# Patient Record
Sex: FEMALE | Race: BLACK OR AFRICAN AMERICAN | NOT HISPANIC OR LATINO | URBAN - METROPOLITAN AREA
[De-identification: names, ages, dates, MRNs, and addresses within clinical notes are randomized per-mention and may not be internally consistent; named-entity substitution may affect disease eponyms.]

---

## 2020-11-19 ENCOUNTER — INPATIENT (INPATIENT)
Facility: HOSPITAL | Age: 70
LOS: 4 days | Discharge: HOME HEALTH | End: 2020-11-24
Attending: STUDENT IN AN ORGANIZED HEALTH CARE EDUCATION/TRAINING PROGRAM | Admitting: STUDENT IN AN ORGANIZED HEALTH CARE EDUCATION/TRAINING PROGRAM
Payer: SELF-PAY

## 2020-11-19 VITALS
RESPIRATION RATE: 16 BRPM | OXYGEN SATURATION: 100 % | SYSTOLIC BLOOD PRESSURE: 113 MMHG | HEART RATE: 103 BPM | DIASTOLIC BLOOD PRESSURE: 72 MMHG | TEMPERATURE: 98 F

## 2020-11-19 LAB
ALBUMIN SERPL ELPH-MCNC: 4.5 G/DL — SIGNIFICANT CHANGE UP (ref 3.3–5)
ALP SERPL-CCNC: 107 U/L — SIGNIFICANT CHANGE UP (ref 40–120)
ALT FLD-CCNC: 7 U/L — SIGNIFICANT CHANGE UP (ref 4–33)
ANION GAP SERPL CALC-SCNC: 15 MMO/L — HIGH (ref 7–14)
APPEARANCE UR: CLEAR — SIGNIFICANT CHANGE UP
APTT BLD: 25.4 SEC — LOW (ref 27–36.3)
AST SERPL-CCNC: 13 U/L — SIGNIFICANT CHANGE UP (ref 4–32)
BASOPHILS # BLD AUTO: 0.05 K/UL — SIGNIFICANT CHANGE UP (ref 0–0.2)
BASOPHILS NFR BLD AUTO: 0.5 % — SIGNIFICANT CHANGE UP (ref 0–2)
BILIRUB SERPL-MCNC: 0.7 MG/DL — SIGNIFICANT CHANGE UP (ref 0.2–1.2)
BILIRUB UR-MCNC: NEGATIVE — SIGNIFICANT CHANGE UP
BLOOD UR QL VISUAL: NEGATIVE — SIGNIFICANT CHANGE UP
BUN SERPL-MCNC: 24 MG/DL — HIGH (ref 7–23)
CALCIUM SERPL-MCNC: 9.9 MG/DL — SIGNIFICANT CHANGE UP (ref 8.4–10.5)
CHLORIDE SERPL-SCNC: 110 MMOL/L — HIGH (ref 98–107)
CO2 SERPL-SCNC: 20 MMOL/L — LOW (ref 22–31)
COLOR SPEC: SIGNIFICANT CHANGE UP
CREAT SERPL-MCNC: 0.89 MG/DL — SIGNIFICANT CHANGE UP (ref 0.5–1.3)
EOSINOPHIL # BLD AUTO: 0.06 K/UL — SIGNIFICANT CHANGE UP (ref 0–0.5)
EOSINOPHIL NFR BLD AUTO: 0.5 % — SIGNIFICANT CHANGE UP (ref 0–6)
GLUCOSE SERPL-MCNC: 89 MG/DL — SIGNIFICANT CHANGE UP (ref 70–99)
GLUCOSE UR-MCNC: NEGATIVE — SIGNIFICANT CHANGE UP
HCT VFR BLD CALC: 46.7 % — HIGH (ref 34.5–45)
HGB BLD-MCNC: 15.5 G/DL — SIGNIFICANT CHANGE UP (ref 11.5–15.5)
IMM GRANULOCYTES NFR BLD AUTO: 0.4 % — SIGNIFICANT CHANGE UP (ref 0–1.5)
INR BLD: 1.06 — SIGNIFICANT CHANGE UP (ref 0.88–1.16)
KETONES UR-MCNC: SIGNIFICANT CHANGE UP
LEUKOCYTE ESTERASE UR-ACNC: NEGATIVE — SIGNIFICANT CHANGE UP
LYMPHOCYTES # BLD AUTO: 3.58 K/UL — HIGH (ref 1–3.3)
LYMPHOCYTES # BLD AUTO: 32.4 % — SIGNIFICANT CHANGE UP (ref 13–44)
MAGNESIUM SERPL-MCNC: 2.1 MG/DL — SIGNIFICANT CHANGE UP (ref 1.6–2.6)
MCHC RBC-ENTMCNC: 30.6 PG — SIGNIFICANT CHANGE UP (ref 27–34)
MCHC RBC-ENTMCNC: 33.2 % — SIGNIFICANT CHANGE UP (ref 32–36)
MCV RBC AUTO: 92.1 FL — SIGNIFICANT CHANGE UP (ref 80–100)
MONOCYTES # BLD AUTO: 0.84 K/UL — SIGNIFICANT CHANGE UP (ref 0–0.9)
MONOCYTES NFR BLD AUTO: 7.6 % — SIGNIFICANT CHANGE UP (ref 2–14)
NEUTROPHILS # BLD AUTO: 6.49 K/UL — SIGNIFICANT CHANGE UP (ref 1.8–7.4)
NEUTROPHILS NFR BLD AUTO: 58.6 % — SIGNIFICANT CHANGE UP (ref 43–77)
NITRITE UR-MCNC: NEGATIVE — SIGNIFICANT CHANGE UP
NRBC # FLD: 0 K/UL — SIGNIFICANT CHANGE UP (ref 0–0)
PH UR: 5.5 — SIGNIFICANT CHANGE UP (ref 5–8)
PHOSPHATE SERPL-MCNC: 2.8 MG/DL — SIGNIFICANT CHANGE UP (ref 2.5–4.5)
PLATELET # BLD AUTO: 310 K/UL — SIGNIFICANT CHANGE UP (ref 150–400)
PMV BLD: 10.4 FL — SIGNIFICANT CHANGE UP (ref 7–13)
POTASSIUM SERPL-MCNC: 3.8 MMOL/L — SIGNIFICANT CHANGE UP (ref 3.5–5.3)
POTASSIUM SERPL-SCNC: 3.8 MMOL/L — SIGNIFICANT CHANGE UP (ref 3.5–5.3)
PROT SERPL-MCNC: 7.5 G/DL — SIGNIFICANT CHANGE UP (ref 6–8.3)
PROT UR-MCNC: 10 — SIGNIFICANT CHANGE UP
PROTHROM AB SERPL-ACNC: 12.1 SEC — SIGNIFICANT CHANGE UP (ref 10.6–13.6)
RBC # BLD: 5.07 M/UL — SIGNIFICANT CHANGE UP (ref 3.8–5.2)
RBC # FLD: 13.7 % — SIGNIFICANT CHANGE UP (ref 10.3–14.5)
SODIUM SERPL-SCNC: 145 MMOL/L — SIGNIFICANT CHANGE UP (ref 135–145)
SP GR SPEC: 1.02 — SIGNIFICANT CHANGE UP (ref 1–1.04)
TSH SERPL-MCNC: 1.85 UIU/ML — SIGNIFICANT CHANGE UP (ref 0.27–4.2)
UROBILINOGEN FLD QL: NORMAL — SIGNIFICANT CHANGE UP
WBC # BLD: 11.06 K/UL — HIGH (ref 3.8–10.5)
WBC # FLD AUTO: 11.06 K/UL — HIGH (ref 3.8–10.5)

## 2020-11-19 PROCEDURE — 71045 X-RAY EXAM CHEST 1 VIEW: CPT | Mod: 26

## 2020-11-19 PROCEDURE — 70450 CT HEAD/BRAIN W/O DYE: CPT | Mod: 26

## 2020-11-19 NOTE — ED ADULT TRIAGE NOTE - CHIEF COMPLAINT QUOTE
Pt brought in from urgent care for elevated blood pressure and dizziness. Pt endorses dizziness at this present moment, + headache, denies chest pain, no shortness of breath. Pt has h/o cva in the past, unable to tell me if she had any residual weakness related to cva in the past. Pt endorses increasing weakness to Rt lower extremity and rt upper extremity, states this weakness has progressed for months. Pt not on anti-coagulation. Pt ambulatory with cane.

## 2020-11-19 NOTE — ED ADULT NURSE NOTE - OBJECTIVE STATEMENT
Received PT to room 1, A&Ox3, with moments of confusion, c/o right sided pain and numbness (arm, hip, and lower leg). PT states was sent by urgent care due to HTN and dizziness. PT states symptoms have been for about a month. Ambulates with cane. Denies chest pain, SOB, abdominal pain, N/V/D, dysuria. No swelling noted. Left hand 22g placed, labs drawn, bed in lowest, rails up, will continue to monitor. Left

## 2020-11-19 NOTE — ED PROVIDER NOTE - PROGRESS NOTE DETAILS
Mimi Rivero M.D. Resident  Neuro will see patient after higher priority patients seen. Jimmie BARGER: Patient received at signout pending neuro eval and recs. Reassessed and endorse R sided weakness and difficulty ambulating

## 2020-11-19 NOTE — ED PROVIDER NOTE - PHYSICAL EXAMINATION
Mimi Rivero M.D. Resident  GENl: Patient awake alert NAD.   HEENT: normocephalic, atraumatic, EOMI, PERRL, no scleral icterus, moist MM  CARDIAC: RRR, S1, S2, no murmur.   PULM: CTA B/L no wheeze, rhonchi, rales.   ABD: soft NT, ND, no rebound no guarding, no CVA tenderness.   MSK: no edema. +intermittently moves all extremities, 4/5 strength in right upper and lower extremity, ROM of upper and lower extremity limited by pain.      NEURO: A&Ox3, + paresthesia and weakness in upper and lower extremity + bicep and patellar reflex on right side.   SKIN: warm, dry, no rash.

## 2020-11-19 NOTE — ED PROVIDER NOTE - CLINICAL SUMMARY MEDICAL DECISION MAKING FREE TEXT BOX
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 Inconsistent neurological exam, strength waxes and wanes, ROM limited by pain. Possible CNS or PNS, endocrine, electrolyte etiology, progression of neurosarcoidoses. Less likely infection. CT head, labs, electrolyte, neuro consult, admission.

## 2020-11-19 NOTE — ED PROVIDER NOTE - ATTENDING CONTRIBUTION TO CARE
Attending Statement: I have personally seen and examined this patient. I have fully participated in the care of this patient. I have reviewed all pertinent clinical information, including history physical exam, plan and the Resident's note and agree except as noted  71 yo F pmhx of CVA no residual deficits, HTN, seizures, neurosarcoidosis not on medications, from home   weakness of the right side of body x a month, intermittent pain, forgetfulness and dec function over a month. no new sx today, endorsing pain of the right arm/leg. paresthesia/shooting pain of the right arm/leg. no headache no change vision/speech no cp or sob no abdominal pain no n/v no urinary complaints  Vital signs noted. AO3 nontoxic female. no resp distress soft nontender abdomen. no  rebound. no guarding. no sign of trauma. no CVAT moving all ext, nl  bl hands. dec sensation of right leg. able to walk w cane. no pedal edema. no calf tenderness. normal pulses bilateral feet.  plan labs, ct head, urine, neurology cs, tba lives alone.

## 2020-11-19 NOTE — ED PROVIDER NOTE - NS ED ROS FT
GENERAL: No fever, chills + generalized weakness.   EYES: no vision changes, no discharge.   ENT: no difficulty swallowing or speaking   CARDIAC: no chest pain/pressure, SOB, lower extremity swelling  PULMONARY: no cough, SOB  GI: no abdominal pain, n/v/d  : no dysuria, no hematuria  SKIN: no rashes, no ecchymosis  NEURO: no headache, lightheadedness  MSK: + right arm and leg weakness and pain.

## 2020-11-19 NOTE — CONSULT NOTE ADULT - ATTENDING COMMENTS
Patient seen and examined with neurology team and above note reviewed and I agree with assessment and plan as outlined. Patient hx as noted and presents with bilateral leg weakness and heaviness , poor gait and increased pain in her legs. She believes she may have fallen the day prior to admission but cannot recall the details. In addition, she feels her memory and recall has been poor and her ability to retain information as well.   On exam she has normal cranial nerves, motor exam shows limited mobility and range of motion of bilateral legs and increased sensitivity to touch and vibration.  No weakness in arms.  Reflexes are diminished in legs and plantars are downgoing.  Gait cannot assess    MRI brain reviewed and no acute ischemia, old left frontal contusion and microvascular ischemic changes.    Assessment and plan: Patient is 70 years old with leg weakness, and heaviness and allodynia in bilateral legs as well with decreased reflexes.    1. Obtain MRI lumbar spine without contrast   2. CPK levels and ACE levels   3. MRI brain with contrast for more detailed assessment given her hx of sarcoidosis   4. PT and OT   5. Conservative pain control    All questions answered and patient understood plan and is willing to comply.

## 2020-11-19 NOTE — ED PROVIDER NOTE - OBJECTIVE STATEMENT
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 Pain is worse with movement, waxes and wanes, shoots from proximal to distal extremities. Per daughter at bedside, pt lives alone, has been more forgetful, and having difficulty managing living by herself. Pt endorse SOB with exertion and fatigue, but no CP, no nausea, vomiting, dizziness, GI or  symptoms, fever or chills.

## 2020-11-19 NOTE — CONSULT NOTE ADULT - SUBJECTIVE AND OBJECTIVE BOX
NAV ANNA  Female  MRN-[id number]    HPI:    70F w/ PMH of ischemic stroke (w/ no residual, unclear territory, on ASA?), Seizure disorder (on ?), neurosarcoidosis (not on meds) presents w/ one month of RUE and RLE pain and weakness for the past month.       CTH: No acute pathology. Encephelomalacia of the L. anterior inferior frontal cortex consistent w/ trauama vs. chronic infarct.     NIHSS:  MRS:     ROS: All negative except as mentioned in HPI    PAST MEDICAL & SURGICAL HISTORY:  CVA (cerebral vascular accident)    HTN (hypertension)    Seizure    No significant past surgical history      MEDICATIONS  (STANDING):    MEDICATIONS  (PRN):    Allergies    No Known Allergies    Intolerances        VITAL SIGNS:  T(F): 98.4  HR: 93  BP: 139/73  RR: 16  SpO2: 98%    Exam:   MS: Oriented x3. Fluent. Follows crossed commands.   CN: VFF. EOMI. V1-V3 intact. Face symmetric. T/u midline.   Motor: Full strength throughout.   Sensory: Intact to LT throughout   Reflexes: 2+ throughout. Babinski absent bilaterally.   Coordination: No dysmetria on FNF or ataxia on HTS bilaterally   Gait: Deferred    LABS:                          15.5   11.06 )-----------( 310      ( 19 Nov 2020 20:27 )             46.7     11-19    145  |  110<H>  |  24<H>  ----------------------------<  89  3.8   |  20<L>  |  0.89    Ca    9.9      19 Nov 2020 20:27  Phos  2.8     11-19  Mg     2.1     11-19    TPro  7.5  /  Alb  4.5  /  TBili  0.7  /  DBili  x   /  AST  13  /  ALT  7   /  AlkPhos  107  11-19    PT/INR - ( 19 Nov 2020 20:27 )   PT: 12.1 SEC;   INR: 1.06          PTT - ( 19 Nov 2020 20:27 )  PTT:25.4 SEC    RADIOLOGY & ADDITIONAL STUDIES:             NAV ANNA  Female  MRN-[id number]    HPI:    70F w/ PMH of ischemic stroke (w/ no residual, unclear territory), Seizure disorder (not on meds, last seizure decade ago), neurosarcoidosis (not on meds) presents w/ one month of RUE and RLE pain and weakness for the past few months.     Patient is a poor historian. States she has memory difficulties. I was unable to get a hold of her daugther for collateral. Patient states for the past couple months shes noticed worsening pain and weaknes on her right side. Burning type pain. Tender to the touch. Denies falling. No previous history of such complaints. No urinary issues.     Has history of neurosarcoid dx several years ago. Does not follow with Rheumatology. No meds. Last seizure over a decade ago. Not on any meds.     CTH: No acute pathology. Encephelomalacia of the L. anterior inferior frontal cortex consistent w/ trauama vs. chronic infarct.     ROS: All negative except as mentioned in HPI    PAST MEDICAL & SURGICAL HISTORY:  CVA (cerebral vascular accident)    HTN (hypertension)    Seizure    No significant past surgical history      MEDICATIONS  (STANDING):    MEDICATIONS  (PRN):    Allergies    No Known Allergies    Intolerances        VITAL SIGNS:  T(F): 98.4  HR: 93  BP: 139/73  RR: 16  SpO2: 98%    Exam:   MS: Oriented x3. Fluent. Follows crossed commands.   CN: VFF. EOMI. V1-V3 intact. Face symmetric. T/u midline.   Motor: RUE weakness involving the bicep and tricep though pain limited. RLE some movement in plane of bed but no effort against gravity. Left side full.    Sensory: Intact to LT throughout   Reflexes: 1+ throughout. Babinski absent bilaterally.   Coordination: No dysmetria on FNF or ataxia on HTS bilaterally   Gait: Deferred    LABS:                          15.5   11.06 )-----------( 310      ( 19 Nov 2020 20:27 )             46.7     11-19    145  |  110<H>  |  24<H>  ----------------------------<  89  3.8   |  20<L>  |  0.89    Ca    9.9      19 Nov 2020 20:27  Phos  2.8     11-19  Mg     2.1     11-19    TPro  7.5  /  Alb  4.5  /  TBili  0.7  /  DBili  x   /  AST  13  /  ALT  7   /  AlkPhos  107  11-19    PT/INR - ( 19 Nov 2020 20:27 )   PT: 12.1 SEC;   INR: 1.06          PTT - ( 19 Nov 2020 20:27 )  PTT:25.4 SEC    RADIOLOGY & ADDITIONAL STUDIES:             NAV ANNA  Female  MRN-[id number]    HPI:    70F w/ PMH of ischemic stroke (w/ no residual, unclear territory), Seizure disorder (not on meds, last seizure decade ago), neurosarcoidosis (not on meds) presents w/ one month of RUE and RLE pain and weakness for the past few months.     Patient is a poor historian. States she has memory difficulties. I was unable to get a hold of her daugther for collateral. Patient states for the past couple months shes noticed worsening pain and weaknes on her right side. Burning type pain. Tender to the touch. Denies falling. No previous history of such complaints. No urinary issues.     Has history of neurosarcoid dx several years ago. Does not follow with Rheumatology. No meds. Last seizure over a decade ago. Not on any meds.     CTH: No acute pathology. Encephelomalacia of the L. anterior inferior frontal cortex consistent w/ trauama vs. chronic infarct.     ROS: All negative except as mentioned in HPI    PAST MEDICAL & SURGICAL HISTORY:  CVA (cerebral vascular accident)    HTN (hypertension)    Seizure    No significant past surgical history      MEDICATIONS  (STANDING):    MEDICATIONS  (PRN):    Allergies    No Known Allergies    Intolerances        VITAL SIGNS:  T(F): 98.4  HR: 93  BP: 139/73  RR: 16  SpO2: 98%    Exam:   MS: Oriented x3. Fluent. Follows crossed commands.   CN: VFF. EOMI. V1-V3 intact. Face symmetric. T/u midline.   Motor: RUE weakness involving the bicep and tricep though pain limited. RLE some movement in plane of bed but no effort against gravity. Left side full.    Sensory: Allodynia to LT and PP involving the RUE and RLE   Reflexes: 1+ throughout. Babinski absent bilaterally.   Coordination: No dysmetria on FNF or ataxia on HTS bilaterally   Gait: Deferred    LABS:                          15.5   11.06 )-----------( 310      ( 19 Nov 2020 20:27 )             46.7     11-19    145  |  110<H>  |  24<H>  ----------------------------<  89  3.8   |  20<L>  |  0.89    Ca    9.9      19 Nov 2020 20:27  Phos  2.8     11-19  Mg     2.1     11-19    TPro  7.5  /  Alb  4.5  /  TBili  0.7  /  DBili  x   /  AST  13  /  ALT  7   /  AlkPhos  107  11-19    PT/INR - ( 19 Nov 2020 20:27 )   PT: 12.1 SEC;   INR: 1.06          PTT - ( 19 Nov 2020 20:27 )  PTT:25.4 SEC    RADIOLOGY & ADDITIONAL STUDIES:

## 2020-11-19 NOTE — CONSULT NOTE ADULT - ASSESSMENT
Plan:   [] MR brain w/w/o   [] ESR, CRP  [] Rheumatology consult    70F w/ PMH of ischemic stroke (w/ no residual, unclear territory), Seizure disorder (not on meds, last seizure decade ago), neurosarcoidosis (not on meds) presents w/ one month of RUE and RLE pain and weakness for the past few months. Exam w/ R. hemiparesis, pain limited.     Impression: Subacute, progressive, painful R. hemiaparesis consistent w/ L. thalamic vs. peripheral nerve dysfunction. Differential includes thalamic infarct, Focal seizures, CNS inflammation, PNS inflammation (i.e mononeuritis multiplex related to neurosarcoid). Doubt cord pathology given the painful component.     Plan:   [] MR brain w/w/o   [] VEEG for 24 hours, can stop if negative   [] ESR, CRP, DSDNA, CARLITOS, ANCA  [] Rheumatology consult   [] PT/OT    70F w/ PMH of ischemic stroke (w/ no residual, unclear territory), Seizure disorder (not on meds, last seizure decade ago), neurosarcoidosis (not on meds) presents w/ one month of RUE and RLE pain and weakness for the past few months. Exam w/ R. hemiparesis, pain limited.     Impression: Subacute, progressive, painful R. hemiaparesis consistent w/ L. thalamic vs. peripheral nerve dysfunction. Differential includes thalamic infarct, Focal seizures, CNS inflammation, PNS inflammation (i.e mononeuritis multiplex related to neurosarcoid). Doubt cord pathology given the painful component.     Plan:     [] MR brain w/w/o   [] VEEG for 24 hours, can stop if negative   [] ESR, CRP, DSDNA, CARLITOS, ANCA  [] Rheumatology consult   [] PT/OT

## 2020-11-19 NOTE — ED ADULT NURSE REASSESSMENT NOTE - NS ED NURSE REASSESS COMMENT FT1
PT in bed, VS stable. Not expressing pain at the moment, appears comfortable. Will continue to monitor. Awaiting neuro.

## 2020-11-20 DIAGNOSIS — Z29.9 ENCOUNTER FOR PROPHYLACTIC MEASURES, UNSPECIFIED: ICD-10-CM

## 2020-11-20 DIAGNOSIS — Z93.0 TRACHEOSTOMY STATUS: Chronic | ICD-10-CM

## 2020-11-20 DIAGNOSIS — D86.89 SARCOIDOSIS OF OTHER SITES: ICD-10-CM

## 2020-11-20 DIAGNOSIS — I10 ESSENTIAL (PRIMARY) HYPERTENSION: ICD-10-CM

## 2020-11-20 DIAGNOSIS — R53.1 WEAKNESS: ICD-10-CM

## 2020-11-20 DIAGNOSIS — Z90.89 ACQUIRED ABSENCE OF OTHER ORGANS: Chronic | ICD-10-CM

## 2020-11-20 DIAGNOSIS — R56.9 UNSPECIFIED CONVULSIONS: ICD-10-CM

## 2020-11-20 LAB
ALBUMIN SERPL ELPH-MCNC: 4.4 G/DL — SIGNIFICANT CHANGE UP (ref 3.3–5)
ALP SERPL-CCNC: 111 U/L — SIGNIFICANT CHANGE UP (ref 40–120)
ALT FLD-CCNC: 6 U/L — SIGNIFICANT CHANGE UP (ref 4–33)
ANION GAP SERPL CALC-SCNC: 15 MMO/L — HIGH (ref 7–14)
AST SERPL-CCNC: 15 U/L — SIGNIFICANT CHANGE UP (ref 4–32)
BASOPHILS # BLD AUTO: 0.04 K/UL — SIGNIFICANT CHANGE UP (ref 0–0.2)
BASOPHILS NFR BLD AUTO: 0.4 % — SIGNIFICANT CHANGE UP (ref 0–2)
BILIRUB SERPL-MCNC: 1.3 MG/DL — HIGH (ref 0.2–1.2)
BUN SERPL-MCNC: 20 MG/DL — SIGNIFICANT CHANGE UP (ref 7–23)
CALCIUM SERPL-MCNC: 10.2 MG/DL — SIGNIFICANT CHANGE UP (ref 8.4–10.5)
CHLORIDE SERPL-SCNC: 109 MMOL/L — HIGH (ref 98–107)
CHOLEST SERPL-MCNC: 218 MG/DL — HIGH (ref 120–199)
CO2 SERPL-SCNC: 21 MMOL/L — LOW (ref 22–31)
CREAT SERPL-MCNC: 0.78 MG/DL — SIGNIFICANT CHANGE UP (ref 0.5–1.3)
CRP SERPL-MCNC: 4.3 MG/L — SIGNIFICANT CHANGE UP
DIRECT LDL: 148 MG/DL — SIGNIFICANT CHANGE UP
EOSINOPHIL # BLD AUTO: 0.07 K/UL — SIGNIFICANT CHANGE UP (ref 0–0.5)
EOSINOPHIL NFR BLD AUTO: 0.6 % — SIGNIFICANT CHANGE UP (ref 0–6)
ERYTHROCYTE [SEDIMENTATION RATE] IN BLOOD: 21 MM/HR — SIGNIFICANT CHANGE UP (ref 4–25)
GLUCOSE SERPL-MCNC: 83 MG/DL — SIGNIFICANT CHANGE UP (ref 70–99)
HCT VFR BLD CALC: 46.8 % — HIGH (ref 34.5–45)
HDLC SERPL-MCNC: 71 MG/DL — HIGH (ref 45–65)
HGB BLD-MCNC: 15.3 G/DL — SIGNIFICANT CHANGE UP (ref 11.5–15.5)
IMM GRANULOCYTES NFR BLD AUTO: 0.3 % — SIGNIFICANT CHANGE UP (ref 0–1.5)
LYMPHOCYTES # BLD AUTO: 3.43 K/UL — HIGH (ref 1–3.3)
LYMPHOCYTES # BLD AUTO: 31.8 % — SIGNIFICANT CHANGE UP (ref 13–44)
MAGNESIUM SERPL-MCNC: 2 MG/DL — SIGNIFICANT CHANGE UP (ref 1.6–2.6)
MCHC RBC-ENTMCNC: 30.3 PG — SIGNIFICANT CHANGE UP (ref 27–34)
MCHC RBC-ENTMCNC: 32.7 % — SIGNIFICANT CHANGE UP (ref 32–36)
MCV RBC AUTO: 92.7 FL — SIGNIFICANT CHANGE UP (ref 80–100)
MONOCYTES # BLD AUTO: 0.94 K/UL — HIGH (ref 0–0.9)
MONOCYTES NFR BLD AUTO: 8.7 % — SIGNIFICANT CHANGE UP (ref 2–14)
NEUTROPHILS # BLD AUTO: 6.26 K/UL — SIGNIFICANT CHANGE UP (ref 1.8–7.4)
NEUTROPHILS NFR BLD AUTO: 58.2 % — SIGNIFICANT CHANGE UP (ref 43–77)
NRBC # FLD: 0 K/UL — SIGNIFICANT CHANGE UP (ref 0–0)
PLATELET # BLD AUTO: 288 K/UL — SIGNIFICANT CHANGE UP (ref 150–400)
PMV BLD: 10.6 FL — SIGNIFICANT CHANGE UP (ref 7–13)
POTASSIUM SERPL-MCNC: 4 MMOL/L — SIGNIFICANT CHANGE UP (ref 3.5–5.3)
POTASSIUM SERPL-SCNC: 4 MMOL/L — SIGNIFICANT CHANGE UP (ref 3.5–5.3)
PROT SERPL-MCNC: 8 G/DL — SIGNIFICANT CHANGE UP (ref 6–8.3)
RBC # BLD: 5.05 M/UL — SIGNIFICANT CHANGE UP (ref 3.8–5.2)
RBC # FLD: 13.8 % — SIGNIFICANT CHANGE UP (ref 10.3–14.5)
SARS-COV-2 RNA SPEC QL NAA+PROBE: SIGNIFICANT CHANGE UP
SODIUM SERPL-SCNC: 145 MMOL/L — SIGNIFICANT CHANGE UP (ref 135–145)
TRIGL SERPL-MCNC: 83 MG/DL — SIGNIFICANT CHANGE UP (ref 10–149)
WBC # BLD: 10.77 K/UL — HIGH (ref 3.8–10.5)
WBC # FLD AUTO: 10.77 K/UL — HIGH (ref 3.8–10.5)

## 2020-11-20 PROCEDURE — 99223 1ST HOSP IP/OBS HIGH 75: CPT | Mod: GC

## 2020-11-20 PROCEDURE — 99284 EMERGENCY DEPT VISIT MOD MDM: CPT

## 2020-11-20 PROCEDURE — 93970 EXTREMITY STUDY: CPT | Mod: 26

## 2020-11-20 PROCEDURE — 93306 TTE W/DOPPLER COMPLETE: CPT | Mod: 26

## 2020-11-20 PROCEDURE — 70551 MRI BRAIN STEM W/O DYE: CPT | Mod: 26

## 2020-11-20 PROCEDURE — 99053 MED SERV 10PM-8AM 24 HR FAC: CPT

## 2020-11-20 RX ORDER — AMLODIPINE BESYLATE 2.5 MG/1
10 TABLET ORAL DAILY
Refills: 0 | Status: DISCONTINUED | OUTPATIENT
Start: 2020-11-20 | End: 2020-11-24

## 2020-11-20 RX ORDER — ENOXAPARIN SODIUM 100 MG/ML
40 INJECTION SUBCUTANEOUS DAILY
Refills: 0 | Status: DISCONTINUED | OUTPATIENT
Start: 2020-11-20 | End: 2020-11-24

## 2020-11-20 RX ORDER — ASPIRIN/CALCIUM CARB/MAGNESIUM 324 MG
81 TABLET ORAL DAILY
Refills: 0 | Status: DISCONTINUED | OUTPATIENT
Start: 2020-11-20 | End: 2020-11-24

## 2020-11-20 RX ORDER — ATORVASTATIN CALCIUM 80 MG/1
80 TABLET, FILM COATED ORAL AT BEDTIME
Refills: 0 | Status: DISCONTINUED | OUTPATIENT
Start: 2020-11-20 | End: 2020-11-24

## 2020-11-20 RX ORDER — ACETAMINOPHEN 500 MG
650 TABLET ORAL EVERY 6 HOURS
Refills: 0 | Status: DISCONTINUED | OUTPATIENT
Start: 2020-11-20 | End: 2020-11-24

## 2020-11-20 RX ORDER — AMLODIPINE BESYLATE AND BENAZEPRIL HYDROCHLORIDE 10; 20 MG/1; MG/1
1 CAPSULE ORAL
Qty: 0 | Refills: 0 | DISCHARGE

## 2020-11-20 RX ORDER — LISINOPRIL 2.5 MG/1
20 TABLET ORAL DAILY
Refills: 0 | Status: DISCONTINUED | OUTPATIENT
Start: 2020-11-20 | End: 2020-11-24

## 2020-11-20 RX ADMIN — Medication 1 TABLET(S): at 18:04

## 2020-11-20 RX ADMIN — LISINOPRIL 20 MILLIGRAM(S): 2.5 TABLET ORAL at 18:04

## 2020-11-20 RX ADMIN — AMLODIPINE BESYLATE 10 MILLIGRAM(S): 2.5 TABLET ORAL at 18:03

## 2020-11-20 RX ADMIN — ATORVASTATIN CALCIUM 80 MILLIGRAM(S): 80 TABLET, FILM COATED ORAL at 21:53

## 2020-11-20 RX ADMIN — ENOXAPARIN SODIUM 40 MILLIGRAM(S): 100 INJECTION SUBCUTANEOUS at 18:04

## 2020-11-20 NOTE — H&P ADULT - RS GEN PE MLT RESP DETAILS PC
breath sounds equal/clear to auscultation bilaterally/good air movement/no chest wall tenderness/respirations non-labored/airway patent

## 2020-11-20 NOTE — H&P ADULT - PROBLEM SELECTOR PLAN 1
EKG/Telemetry, TSH, mg, [facility] Neuro c/s following, CT Head, MRI Head, PT/OT c/s, started on aspirin and statin, Echo w/bubble study; b/l LE US to r/o dvt

## 2020-11-20 NOTE — OCCUPATIONAL THERAPY INITIAL EVALUATION ADULT - PERTINENT HX OF CURRENT PROBLEM, REHAB EVAL
70 year old female with history of CVA (1995) with no residual deficits, HTN, Seizures, Neurosarcoidosis, presented to the [facility] ED with right upper and lower extremity pain and weakness x 1 month. Admitted to r/o CVA. MRI brain revealing chronic left frontal lobe encephalomalacia, however, no evidence of acute ischemia.

## 2020-11-20 NOTE — SWALLOW BEDSIDE ASSESSMENT ADULT - SWALLOW EVAL: DIAGNOSIS
1. The patient demonstrated functional oral management of puree and thin liquid textures marked by adequate bolus collection, transfer and posterior transport. 2. The patient demonstrated a mild oral dysphagia for mechanical soft marked by delayed bolus collection, transfer and posterior transport. 3. The patient demonstrated a mild oral dysphagia for all consistencies trialed marked by delayed initiation of swallow trigger with adequate hyolaryngeal elevation upon digital palpation without evidence of laryngeal penetration. 4. Recommend dysphagia 2 mechanical soft with thin liquids + aspiration precautions.

## 2020-11-20 NOTE — ED ADULT NURSE REASSESSMENT NOTE - NS ED NURSE REASSESS COMMENT FT1
as per pt she is missing her wallet, spoke to pts daughter and pts daughter states she has her mother/pts wallet and coat. also states this is part of her mothers medical complaint in which she has some memory loss. pt in nad

## 2020-11-20 NOTE — OCCUPATIONAL THERAPY INITIAL EVALUATION ADULT - GENERAL OBSERVATIONS, REHAB EVAL
Patient received semisupine in bed in NAD. Per RN Terri, patient okay to participate in OT evaluation.

## 2020-11-20 NOTE — PHYSICAL THERAPY INITIAL EVALUATION ADULT - PERTINENT HX OF CURRENT PROBLEM, REHAB EVAL
Pt. admitted for right sided weakness for about 1 month. Per radiology report, CT head revealed no acute intracranial hemorrhage, vasogenic edema or extra-axial fluid collection.

## 2020-11-20 NOTE — OCCUPATIONAL THERAPY INITIAL EVALUATION ADULT - LIVES WITH, PROFILE
Patient lives in an apartment with 3-4 steps to enter. Patient reports her daughter has been staying with her recently. Patient has a tub shower with shower chair available.

## 2020-11-20 NOTE — OCCUPATIONAL THERAPY INITIAL EVALUATION ADULT - LUE MMT, REHAB EVAL
Patient presenting with 2+/5 strength throughout left UE during formal MMT exam, however, noted to display at least 3+/5 strength during functional tasks

## 2020-11-20 NOTE — H&P ADULT - ASSESSMENT
71 y/o Female, with a PmHx of CVA (1995) no residual deficits, HTN, Seizures (last seizure was 1995), Neurosarcoidosis not on medications, presented to the [facility] ED with right upper and lower extremity pain and weakness x 1 month. Admitted to telemetry for r/o cva vs seizure.

## 2020-11-20 NOTE — OCCUPATIONAL THERAPY INITIAL EVALUATION ADULT - RANGE OF MOTION EXAMINATION, UPPER EXTREMITY
bilateral UE Active Assistive ROM was WFL  (within functional limits)/Right UE Active ROM was WFL (within functional limits)

## 2020-11-20 NOTE — H&P ADULT - NEGATIVE OPHTHALMOLOGIC SYMPTOMS
no photophobia/no loss of vision L/no diplopia/no blurred vision R/no loss of vision R/no blurred vision L

## 2020-11-20 NOTE — SWALLOW BEDSIDE ASSESSMENT ADULT - ASR SWALLOW ASPIRATION MONITOR
pneumonia/throat clearing/fever/upper respiratory infection/oral hygiene/position upright (90Y)/change of breathing pattern/cough/gurgly voice

## 2020-11-20 NOTE — PHYSICAL THERAPY INITIAL EVALUATION ADULT - SIT-TO-STAND BALANCE
Called [facility] [address] [facility]- spoke with Salena Carbajal- I advised her that I was calling to initaite a refill on the patient's Botox prescription  She states the patient's Botox authorization requires prior authorization through [address]  Phone: [phone]  Will obtain authorization today  [employer]

## 2020-11-20 NOTE — OCCUPATIONAL THERAPY INITIAL EVALUATION ADULT - GROSSLY INTACT, SENSORY
Patient endorsing hypersensitivity of left UE, noted to wince when touching left UE. However, patient noted to use left UE for functional activities (ex. using cell phone, washing hands) without aversion to touch

## 2020-11-20 NOTE — PHYSICAL THERAPY INITIAL EVALUATION ADULT - MANUAL MUSCLE TESTING RESULTS, REHAB EVAL
right UE and LE grossly 3/5; left shoulder 2-/5, left elbow 3-/5; left LE grossly 2-/5. Pt. stated left side felt weaker than right side today. RN made aware. Then noted bilateral LE to be grossly 3+/5 through performance of functional mobility.

## 2020-11-20 NOTE — H&P ADULT - NSICDXPASTMEDICALHX_GEN_ALL_CORE_FT
PAST MEDICAL HISTORY:  CVA (cerebral vascular accident) 1995 - states was in a coma for a couple of days    HTN (hypertension)     Neurosarcoidosis     Seizure 1995

## 2020-11-20 NOTE — OCCUPATIONAL THERAPY INITIAL EVALUATION ADULT - PLANNED THERAPY INTERVENTIONS, OT EVAL
ROM/strengthening/ADL retraining/balance training/bed mobility training/neuromuscular re-education/transfer training

## 2020-11-20 NOTE — H&P ADULT - HISTORY OF PRESENT ILLNESS
71 y/o Female, with a PmHx of CVA (1995) no residual deficits, HTN, Seizures (last seizure was 1995), Neurosarcoidosis not on medications, presented to the [facility] ED with right upper and lower extremity pain and weakness x 1 month. Pt states she has trouble remembering things on occasion but thinks about a month ago she started to get an intermittent RUE and RLE weakness that is worse with trying to get up out of a chair or when walking, she states her "legs just give out". She states it is not all the time. About two weeks ago she had gone to her PCP's office for an evaluation (hasn't been to her PCP for over a year before this) and while there she found out that her PCP office was converted to an urgent care facility and her PCP had moved back to his home country. While at the PCP's office, now an urgent care facility, she was evaluated by the PA that was there and was advised to go the emergency department for a more thorough evaluation because of this new right sided weakness that wasn't there the last time she was in that office. She waited until today to come to [facility] because she stated she waited for her daughter to take her. Pt denies any fever, chills, chest pain, HA, blurred vision, n/v, abd pain, sick contacts, recent travel. She states she does get dizziness on occasion along with SOB with exertion. In the [facility] ED, she was seen by [facility] and was recommended for a full work up. Currently, she states she feels okay but has minimal pain in her lower extremities. She is now being admitted to telemetry for further medical evaluation and treatment to r/o cva vs seizure.

## 2020-11-20 NOTE — OCCUPATIONAL THERAPY INITIAL EVALUATION ADULT - DIAGNOSIS, OT EVAL
s/p right sided weakness, s/p r/o CVA; decreased functional mobility, decreased ADL performance, s/p reported left UE weakness

## 2020-11-20 NOTE — H&P ADULT - NSHPSOCIALHISTORY_GEN_ALL_CORE
Marital Status: /; lives with her daughter    Occupation: Retired [employer]     Tobacco Use: denies    ETOH Use: denies    Flu Vaccine:      10/2020                            Pneumonia Vaccine: denies

## 2020-11-20 NOTE — PHYSICAL THERAPY INITIAL EVALUATION ADULT - ADDITIONAL COMMENTS
Pt. reports owning a straight cane.     Pt. was left seated at edge of bed post PT Evaluation, no apparent distress, call bell within reach. RN made aware of pt. status and participation in PT.

## 2020-11-20 NOTE — H&P ADULT - NSHPPHYSICALEXAM_GEN_ALL_CORE
Vital Signs Last 24 Hrs  T(C): 36.6 (20 Nov 2020 03:00), Max: 36.9 (19 Nov 2020 22:27)  T(F): 97.9 (20 Nov 2020 03:00), Max: 98.4 (19 Nov 2020 22:27)  HR: 86 (20 Nov 2020 03:00) (86 - 103)  BP: 124/65 (20 Nov 2020 03:00) (113/72 - 157/95)  BP(mean): --  RR: 15 (20 Nov 2020 03:00) (15 - 16)  SpO2: 100% (20 Nov 2020 03:00) (98% - 100%)    EKG: NSR @ 98, no changes

## 2020-11-20 NOTE — SWALLOW BEDSIDE ASSESSMENT ADULT - COMMENTS
Per charting, the patient is a "69 y/o Female, with a PmHx of CVA (1995) no residual deficits, HTN, Seizures (last seizure was 1995), Neurosarcoidosis not on medications, presented to the [facility] ED with right upper and lower extremity pain and weakness x 1 month. Admitted to telemetry for r/o cva vs seizure."    CT HEAD 11/19: "No acute intracranial hemorrhage, vasogenic edema or extra-axial fluid collection. Sequela of prior trauma or infarct inferior left frontal lobe and moderate chronic microvascular change."    XR CHEST 11/19: "Clear lungs"    Consult received and chart reviewed. The patient was seen at bedside during lunch for an assessment of swallow function, at which time she was awake/alert and oriented X3. The patient was able to follow simple commands, answer yes/no questions and communicate basic wants/needs. RN reported inconsistent cough during medication administration. The patient grimaced during evaluation however upon clinician questioning, the patient denied pain. Patient reported difficulty swallowing foods that are "too hard."

## 2020-11-20 NOTE — PHYSICAL THERAPY INITIAL EVALUATION ADULT - CRITERIA FOR SKILLED THERAPEUTIC INTERVENTIONS
therapy frequency/anticipated equipment needs at discharge/anticipated discharge recommendation/rehab potential/impairments found/predicted duration of therapy intervention

## 2020-11-20 NOTE — SWALLOW BEDSIDE ASSESSMENT ADULT - SWALLOW EVAL: RECOMMENDED FEEDING/EATING TECHNIQUES
maintain upright posture during/after eating for 30 mins/alternate food with liquid/position upright (90 degrees)/small sips/bites

## 2020-11-20 NOTE — H&P ADULT - NSICDXPASTSURGICALHX_GEN_ALL_CORE_FT
PAST SURGICAL HISTORY:  History of tonsillectomy     Status post emergency tracheotomy for assistance in breathing as a child due to an obstruction that was later corrected

## 2020-11-21 LAB
ALBUMIN SERPL ELPH-MCNC: 3.7 G/DL — SIGNIFICANT CHANGE UP (ref 3.3–5)
ALP SERPL-CCNC: 96 U/L — SIGNIFICANT CHANGE UP (ref 40–120)
ALT FLD-CCNC: 7 U/L — SIGNIFICANT CHANGE UP (ref 4–33)
ANION GAP SERPL CALC-SCNC: 11 MMO/L — SIGNIFICANT CHANGE UP (ref 7–14)
AST SERPL-CCNC: 16 U/L — SIGNIFICANT CHANGE UP (ref 4–32)
BASOPHILS # BLD AUTO: 0.05 K/UL — SIGNIFICANT CHANGE UP (ref 0–0.2)
BASOPHILS NFR BLD AUTO: 0.5 % — SIGNIFICANT CHANGE UP (ref 0–2)
BILIRUB SERPL-MCNC: 1 MG/DL — SIGNIFICANT CHANGE UP (ref 0.2–1.2)
BUN SERPL-MCNC: 23 MG/DL — SIGNIFICANT CHANGE UP (ref 7–23)
CALCIUM SERPL-MCNC: 9.8 MG/DL — SIGNIFICANT CHANGE UP (ref 8.4–10.5)
CHLORIDE SERPL-SCNC: 109 MMOL/L — HIGH (ref 98–107)
CO2 SERPL-SCNC: 22 MMOL/L — SIGNIFICANT CHANGE UP (ref 22–31)
CREAT SERPL-MCNC: 0.82 MG/DL — SIGNIFICANT CHANGE UP (ref 0.5–1.3)
EOSINOPHIL # BLD AUTO: 0.1 K/UL — SIGNIFICANT CHANGE UP (ref 0–0.5)
EOSINOPHIL NFR BLD AUTO: 1.1 % — SIGNIFICANT CHANGE UP (ref 0–6)
GLUCOSE SERPL-MCNC: 88 MG/DL — SIGNIFICANT CHANGE UP (ref 70–99)
HCT VFR BLD CALC: 43 % — SIGNIFICANT CHANGE UP (ref 34.5–45)
HCV AB S/CO SERPL IA: 0.07 S/CO — SIGNIFICANT CHANGE UP (ref 0–0.99)
HCV AB SERPL-IMP: SIGNIFICANT CHANGE UP
HGB BLD-MCNC: 14.3 G/DL — SIGNIFICANT CHANGE UP (ref 11.5–15.5)
IMM GRANULOCYTES NFR BLD AUTO: 0.3 % — SIGNIFICANT CHANGE UP (ref 0–1.5)
LYMPHOCYTES # BLD AUTO: 2.36 K/UL — SIGNIFICANT CHANGE UP (ref 1–3.3)
LYMPHOCYTES # BLD AUTO: 25.6 % — SIGNIFICANT CHANGE UP (ref 13–44)
MAGNESIUM SERPL-MCNC: 2 MG/DL — SIGNIFICANT CHANGE UP (ref 1.6–2.6)
MCHC RBC-ENTMCNC: 30.9 PG — SIGNIFICANT CHANGE UP (ref 27–34)
MCHC RBC-ENTMCNC: 33.3 % — SIGNIFICANT CHANGE UP (ref 32–36)
MCV RBC AUTO: 92.9 FL — SIGNIFICANT CHANGE UP (ref 80–100)
MONOCYTES # BLD AUTO: 0.9 K/UL — SIGNIFICANT CHANGE UP (ref 0–0.9)
MONOCYTES NFR BLD AUTO: 9.8 % — SIGNIFICANT CHANGE UP (ref 2–14)
NEUTROPHILS # BLD AUTO: 5.78 K/UL — SIGNIFICANT CHANGE UP (ref 1.8–7.4)
NEUTROPHILS NFR BLD AUTO: 62.7 % — SIGNIFICANT CHANGE UP (ref 43–77)
NRBC # FLD: 0 K/UL — SIGNIFICANT CHANGE UP (ref 0–0)
PHOSPHATE SERPL-MCNC: 3.1 MG/DL — SIGNIFICANT CHANGE UP (ref 2.5–4.5)
PLATELET # BLD AUTO: 290 K/UL — SIGNIFICANT CHANGE UP (ref 150–400)
PMV BLD: 10.4 FL — SIGNIFICANT CHANGE UP (ref 7–13)
POTASSIUM SERPL-MCNC: 3.8 MMOL/L — SIGNIFICANT CHANGE UP (ref 3.5–5.3)
POTASSIUM SERPL-SCNC: 3.8 MMOL/L — SIGNIFICANT CHANGE UP (ref 3.5–5.3)
PROT SERPL-MCNC: 6.8 G/DL — SIGNIFICANT CHANGE UP (ref 6–8.3)
RBC # BLD: 4.63 M/UL — SIGNIFICANT CHANGE UP (ref 3.8–5.2)
RBC # FLD: 13.7 % — SIGNIFICANT CHANGE UP (ref 10.3–14.5)
SODIUM SERPL-SCNC: 142 MMOL/L — SIGNIFICANT CHANGE UP (ref 135–145)
WBC # BLD: 9.22 K/UL — SIGNIFICANT CHANGE UP (ref 3.8–10.5)
WBC # FLD AUTO: 9.22 K/UL — SIGNIFICANT CHANGE UP (ref 3.8–10.5)

## 2020-11-21 PROCEDURE — 99232 SBSQ HOSP IP/OBS MODERATE 35: CPT

## 2020-11-21 RX ADMIN — AMLODIPINE BESYLATE 10 MILLIGRAM(S): 2.5 TABLET ORAL at 05:21

## 2020-11-21 RX ADMIN — ATORVASTATIN CALCIUM 80 MILLIGRAM(S): 80 TABLET, FILM COATED ORAL at 21:26

## 2020-11-21 RX ADMIN — ENOXAPARIN SODIUM 40 MILLIGRAM(S): 100 INJECTION SUBCUTANEOUS at 11:09

## 2020-11-21 RX ADMIN — Medication 81 MILLIGRAM(S): at 11:09

## 2020-11-21 RX ADMIN — Medication 1 TABLET(S): at 11:09

## 2020-11-21 RX ADMIN — LISINOPRIL 20 MILLIGRAM(S): 2.5 TABLET ORAL at 05:21

## 2020-11-21 NOTE — PROGRESS NOTE ADULT - SUBJECTIVE AND OBJECTIVE BOX
[facility] Division of Hospital Medicine  Wong Staley MD  Pager [phone]      Patient is a 70y old  Female who presents with a chief complaint of Right sided weakness (2020 09:16)      SUBJECTIVE / OVERNIGHT EVENTS:    MEDICATIONS  (STANDING):  amLODIPine   Tablet 10 milliGRAM(s) Oral daily  aspirin enteric coated 81 milliGRAM(s) Oral daily  atorvastatin 80 milliGRAM(s) Oral at bedtime  enoxaparin Injectable 40 milliGRAM(s) SubCutaneous daily  lisinopril 20 milliGRAM(s) Oral daily  multivitamin 1 Tablet(s) Oral daily    MEDICATIONS  (PRN):  acetaminophen   Tablet .. 650 milliGRAM(s) Oral every 6 hours PRN Temp greater or equal to 38C (100.4F), Mild Pain (1 - 3), Moderate Pain (4 - 6)      CAPILLARY BLOOD GLUCOSE        I&O's Summary      PHYSICAL EXAM:  Vital Signs Last 24 Hrs  T(C): 36.7 (2020 11:08), Max: 36.9 (2020 21:27)  T(F): 98 (2020 11:08), Max: 98.5 (2020 21:27)  HR: 95 (2020 11:08) (85 - 100)  BP: 113/68 (2020 11:08) (110/66 - 148/87)  BP(mean): --  RR: 18 (2020 11:08) (18 - 18)  SpO2: 98% (2020 11:08) (98% - 98%)  CONSTITUTIONAL: NAD  EYES: conjunctiva and sclera clear  ENMT: mmm  NECK: Supple,  RESPIRATORY: Normal respiratory effort; lungs are clear to auscultation bilaterally  CARDIOVASCULAR: Regular rate and rhythm, + S1 and S2  ABDOMEN: Nontender to palpation, normoactive bowel sounds, no rebound/guarding  MUSCULOSKELETAL:  no clubbing or cyanosis of digits;   PSYCH: A+O   NEUROLOGY: no gross deficits   SKIN: No rashes;     LABS:                        14.3   9.22  )-----------( 290      ( 2020 06:15 )             43.0     11-21    142  |  109<H>  |  23  ----------------------------<  88  3.8   |  22  |  0.82    Ca    9.8      2020 06:15  Phos  3.1     -  Mg     2.0         TPro  6.8  /  Alb  3.7  /  TBili  1.0  /  DBili  x   /  AST  16  /  ALT  7   /  AlkPhos  96  -    PT/INR - ( 2020 20:27 )   PT: 12.1 SEC;   INR: 1.06          PTT - ( 2020 20:27 )  PTT:25.4 SEC      Urinalysis Basic - ( 2020 21:03 )    Color: LIGHT YELLOW / Appearance: CLEAR / S.024 / pH: 5.5  Gluc: NEGATIVE / Ketone: SMALL  / Bili: NEGATIVE / Urobili: NORMAL   Blood: NEGATIVE / Protein: 10 / Nitrite: NEGATIVE   Leuk Esterase: NEGATIVE / RBC: x / WBC x   Sq Epi: x / Non Sq Epi: x / Bacteria: x        Culture - Urine (collected 2020 21:03)  Source: .Urine  Final Report (2020 06:14):    <10,000 CFU/mL Normal Urogenital Nadine

## 2020-11-21 NOTE — PROVIDER CONTACT NOTE (OTHER) - ASSESSMENT
Patient denies chest pain, shortness of breath. nausea/vomiting, and headaches. Patient asymptomatic

## 2020-11-21 NOTE — PROVIDER CONTACT NOTE (OTHER) - ACTION/TREATMENT ORDERED:
Contacted and notified Beena Estes; will continue to monitor Contacted and notified Beena Estes; will continue to monitor  Encourage PO fluids

## 2020-11-21 NOTE — PROGRESS NOTE ADULT - PROBLEM SELECTOR PLAN 1
MRI with no acute findings. EEG pending per neuro recs  Given hx of CVA, c/w ASA and Statin  Echo w/bubble study with no acute findings

## 2020-11-21 NOTE — PROGRESS NOTE ADULT - ASSESSMENT
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 Admitted to telemetry for r/o cva vs seizure.

## 2020-11-22 LAB
ANION GAP SERPL CALC-SCNC: 10 MMO/L — SIGNIFICANT CHANGE UP (ref 7–14)
BUN SERPL-MCNC: 25 MG/DL — HIGH (ref 7–23)
CALCIUM SERPL-MCNC: 10 MG/DL — SIGNIFICANT CHANGE UP (ref 8.4–10.5)
CHLORIDE SERPL-SCNC: 108 MMOL/L — HIGH (ref 98–107)
CO2 SERPL-SCNC: 21 MMOL/L — LOW (ref 22–31)
CREAT SERPL-MCNC: 0.95 MG/DL — SIGNIFICANT CHANGE UP (ref 0.5–1.3)
GLUCOSE SERPL-MCNC: 84 MG/DL — SIGNIFICANT CHANGE UP (ref 70–99)
HCT VFR BLD CALC: 42.7 % — SIGNIFICANT CHANGE UP (ref 34.5–45)
HGB BLD-MCNC: 13.8 G/DL — SIGNIFICANT CHANGE UP (ref 11.5–15.5)
MAGNESIUM SERPL-MCNC: 1.9 MG/DL — SIGNIFICANT CHANGE UP (ref 1.6–2.6)
MCHC RBC-ENTMCNC: 30.1 PG — SIGNIFICANT CHANGE UP (ref 27–34)
MCHC RBC-ENTMCNC: 32.3 % — SIGNIFICANT CHANGE UP (ref 32–36)
MCV RBC AUTO: 93 FL — SIGNIFICANT CHANGE UP (ref 80–100)
NRBC # FLD: 0 K/UL — SIGNIFICANT CHANGE UP (ref 0–0)
PHOSPHATE SERPL-MCNC: 4 MG/DL — SIGNIFICANT CHANGE UP (ref 2.5–4.5)
PLATELET # BLD AUTO: 282 K/UL — SIGNIFICANT CHANGE UP (ref 150–400)
PMV BLD: 10.7 FL — SIGNIFICANT CHANGE UP (ref 7–13)
POTASSIUM SERPL-MCNC: 4.1 MMOL/L — SIGNIFICANT CHANGE UP (ref 3.5–5.3)
POTASSIUM SERPL-SCNC: 4.1 MMOL/L — SIGNIFICANT CHANGE UP (ref 3.5–5.3)
RBC # BLD: 4.59 M/UL — SIGNIFICANT CHANGE UP (ref 3.8–5.2)
RBC # FLD: 13.7 % — SIGNIFICANT CHANGE UP (ref 10.3–14.5)
SODIUM SERPL-SCNC: 139 MMOL/L — SIGNIFICANT CHANGE UP (ref 135–145)
WBC # BLD: 9.42 K/UL — SIGNIFICANT CHANGE UP (ref 3.8–10.5)
WBC # FLD AUTO: 9.42 K/UL — SIGNIFICANT CHANGE UP (ref 3.8–10.5)

## 2020-11-22 PROCEDURE — 93010 ELECTROCARDIOGRAM REPORT: CPT

## 2020-11-22 PROCEDURE — 95718 EEG PHYS/QHP 2-12 HR W/VEEG: CPT

## 2020-11-22 PROCEDURE — 95720 EEG PHY/QHP EA INCR W/VEEG: CPT

## 2020-11-22 PROCEDURE — 99232 SBSQ HOSP IP/OBS MODERATE 35: CPT

## 2020-11-22 RX ORDER — BENZOCAINE AND MENTHOL 5; 1 G/100ML; G/100ML
1 LIQUID ORAL EVERY 4 HOURS
Refills: 0 | Status: DISCONTINUED | OUTPATIENT
Start: 2020-11-22 | End: 2020-11-24

## 2020-11-22 RX ADMIN — ATORVASTATIN CALCIUM 80 MILLIGRAM(S): 80 TABLET, FILM COATED ORAL at 21:51

## 2020-11-22 RX ADMIN — Medication 1 TABLET(S): at 11:36

## 2020-11-22 RX ADMIN — Medication 81 MILLIGRAM(S): at 11:41

## 2020-11-22 RX ADMIN — AMLODIPINE BESYLATE 10 MILLIGRAM(S): 2.5 TABLET ORAL at 05:14

## 2020-11-22 RX ADMIN — LISINOPRIL 20 MILLIGRAM(S): 2.5 TABLET ORAL at 05:14

## 2020-11-22 NOTE — EEG REPORT - NS EEG TEXT BOX
[facility]   COMPREHENSIVE EPILEPSY CENTER   REPORT OF CONTINUOUS VIDEO EEG     [facility]: [address], [place] [address], Ph#: [phone]  [facility]: [address], [place] [address], Ph#: [phone]  Office: [address] [place] [address], [place] [address], [place] [address] Ph#: [phone]    Patient Name: NAV ANNA  Age and : 70y (50)  MRN #: [id number]  Location: [address]  Referring Physician: Les Ochoa    Study Date: 20- 14:00-08:00 18 hours    _____________________________________________________________  STUDY INFORMATION    EEG Recording Technique:  The patient underwent continuous Video-EEG monitoring, using Telemetry System hardware on the XLTek Digital System. EEG and video data were stored on a computer hard drive with important events saved in digital archive files. The material was reviewed by a physician (electroencephalographer / epileptologist) on a daily basis. Joselito and seizure detection algorithms were utilized and reviewed. An EEG Technician attended to the patient, and was available throughout daytime work hours.  The epilepsy center neurologist was available in person or on call 24-hours per day.    EEG Placement and Labeling of Electrodes:  The EEG was performed utilizing 20 channel referential EEG connections (coronal over temporal over parasagittal montage) using all standard 10-20 electrode placements with EKG, with additional electrodes placed in the inferior temporal region using the modified 10-10 montage electrode placements for elective admissions, or if deemed necessary. Recording was at a sampling rate of 256 samples per second per channel. Time synchronized digital video recording was done simultaneously with EEG recording. A low light infrared camera was used for low light recording.     _____________________________________________________________  HISTORY    Patient is a 70y old  Female who presents with a chief complaint of Right sided weakness (2020 13:45)      PERTINENT MEDICATION:  MEDICATIONS  (STANDING):  amLODIPine   Tablet 10 milliGRAM(s) Oral daily  aspirin enteric coated 81 milliGRAM(s) Oral daily  atorvastatin 80 milliGRAM(s) Oral at bedtime  enoxaparin Injectable 40 milliGRAM(s) SubCutaneous daily  lisinopril 20 milliGRAM(s) Oral daily  multivitamin 1 Tablet(s) Oral daily    _____________________________________________________________  INTERPRETATION    Findings: The background was continuous, spontaneously variable and reactive. During wakefulness, the posterior dominant rhythm consisted of symmetric, well-modulated 8.5 Hz activity, with amplitude to 30 uV, that attenuated to eye opening.  Low amplitude frontal beta was noted in wakefulness.    Background Slowing:  No generalized background slowing was present.    Focal Slowing:   None were present.    Sleep Background:  Drowsiness was characterized by fragmentation, attenuation, and slowing of the background activity.    Sleep was characterized by the presence of vertex waves, symmetric sleep spindles and K-complexes.    Other Non-Epileptiform Findings:  None were present.    Interictal Epileptiform Activity:   None were present.    Events:  Clinical events: None recorded.  Seizures: None recorded.    Artifacts:  Intermittent myogenic and movement artifacts were noted.    ECG:  The heart rate on single channel ECG was predominantly between 60-70 BPM.    _____________________________________________________________  EEG SUMMARY/CLASSIFICATION    Normal EEG in the awake, drowsy and asleep states.  _____________________________________________________________  EEG IMPRESSION/CLINICAL CORRELATE    Normal EEG study.  No epileptic pattern or seizure seen.          Mehreen Berry MD  Epilepsy Attending, [facility] [place]

## 2020-11-22 NOTE — PROGRESS NOTE ADULT - SUBJECTIVE AND OBJECTIVE BOX
[facility] Division of Hospital Medicine  Wong Staley MD  Pager [phone]      Patient is a 70y old  Female who presents with a chief complaint of Right sided weakness (21 Nov 2020 13:45)      SUBJECTIVE / OVERNIGHT EVENTS: Mildly improving weakness    MEDICATIONS  (STANDING):  amLODIPine   Tablet 10 milliGRAM(s) Oral daily  aspirin enteric coated 81 milliGRAM(s) Oral daily  atorvastatin 80 milliGRAM(s) Oral at bedtime  enoxaparin Injectable 40 milliGRAM(s) SubCutaneous daily  lisinopril 20 milliGRAM(s) Oral daily  multivitamin 1 Tablet(s) Oral daily    MEDICATIONS  (PRN):  acetaminophen   Tablet .. 650 milliGRAM(s) Oral every 6 hours PRN Temp greater or equal to 38C (100.4F), Mild Pain (1 - 3), Moderate Pain (4 - 6)  benzocaine 15 mG/menthol 3.6 mG (Sugar-Free) Lozenge 1 Lozenge Oral every 4 hours PRN Sore Throat      CAPILLARY BLOOD GLUCOSE        I&O's Summary      PHYSICAL EXAM:  Vital Signs Last 24 Hrs  T(C): 36.6 (22 Nov 2020 11:35), Max: 36.6 (22 Nov 2020 11:35)  T(F): 97.8 (22 Nov 2020 11:35), Max: 97.8 (22 Nov 2020 11:35)  HR: 96 (22 Nov 2020 11:35) (87 - 96)  BP: 113/71 (22 Nov 2020 11:35) (96/62 - 124/86)  BP(mean): --  RR: 16 (22 Nov 2020 11:35) (16 - 18)  SpO2: 97% (22 Nov 2020 11:35) (96% - 98%)  CONSTITUTIONAL: NAD  EYES: conjunctiva and sclera clear  ENMT: mmm  NECK: Supple,  RESPIRATORY: Normal respiratory effort; lungs are clear to auscultation bilaterally  CARDIOVASCULAR: Regular rate and rhythm, + S1 and S2  ABDOMEN: Nontender to palpation, normoactive bowel sounds, no rebound/guarding  MUSCULOSKELETAL:  no clubbing or cyanosis of digits;   PSYCH: A+O x 3  NEUROLOGY: no gross deficits   SKIN: No rashes;     LABS:                        13.8   9.42  )-----------( 282      ( 22 Nov 2020 05:48 )             42.7     11-22    139  |  108<H>  |  25<H>  ----------------------------<  84  4.1   |  21<L>  |  0.95    Ca    10.0      22 Nov 2020 05:48  Phos  4.0     11-22  Mg     1.9     11-22    TPro  6.8  /  Alb  3.7  /  TBili  1.0  /  DBili  x   /  AST  16  /  ALT  7   /  AlkPhos  96  11-21              Culture - Urine (collected 19 Nov 2020 21:03)  Source: .Urine  Final Report (21 Nov 2020 06:14):    <10,000 CFU/mL Normal Urogenital Nadine

## 2020-11-22 NOTE — EEG REPORT - NS EEG TEXT BOX
[facility]   COMPREHENSIVE EPILEPSY CENTER   REPORT OF CONTINUOUS VIDEO EEG     [facility]: [address], [place] [address], Ph#: [phone]  [facility]: [address], [place] [address], Ph#: [phone]  Office: [address] [place] [address], [place] [address], [place] [address] Ph#: [phone]    Patient Name: NAV ANNA  Age and : 70y (50)  MRN #: [id number]  Location: [address]  Referring Physician: Les Ochoa    Study Date: 20- 08:00-15:17   7 hours 17 min    _____________________________________________________________  STUDY INFORMATION    EEG Recording Technique:  The patient underwent continuous Video-EEG monitoring, using Telemetry System hardware on the XLTek Digital System. EEG and video data were stored on a computer hard drive with important events saved in digital archive files. The material was reviewed by a physician (electroencephalographer / epileptologist) on a daily basis. Joselito and seizure detection algorithms were utilized and reviewed. An EEG Technician attended to the patient, and was available throughout daytime work hours.  The epilepsy center neurologist was available in person or on call 24-hours per day.    EEG Placement and Labeling of Electrodes:  The EEG was performed utilizing 20 channel referential EEG connections (coronal over temporal over parasagittal montage) using all standard 10-20 electrode placements with EKG, with additional electrodes placed in the inferior temporal region using the modified 10-10 montage electrode placements for elective admissions, or if deemed necessary. Recording was at a sampling rate of 256 samples per second per channel. Time synchronized digital video recording was done simultaneously with EEG recording. A low light infrared camera was used for low light recording.     _____________________________________________________________  HISTORY    Patient is a 70y old  Female who presents with a chief complaint of Right sided weakness (2020 13:45)      PERTINENT MEDICATION:  MEDICATIONS  (STANDING):  amLODIPine   Tablet 10 milliGRAM(s) Oral daily  aspirin enteric coated 81 milliGRAM(s) Oral daily  atorvastatin 80 milliGRAM(s) Oral at bedtime  enoxaparin Injectable 40 milliGRAM(s) SubCutaneous daily  lisinopril 20 milliGRAM(s) Oral daily  multivitamin 1 Tablet(s) Oral daily    _____________________________________________________________  INTERPRETATION    Findings: The background was continuous, spontaneously variable and reactive. During wakefulness, the posterior dominant rhythm consisted of symmetric, well-modulated 8.5 Hz activity, with amplitude to 30 uV, that attenuated to eye opening.  Low amplitude frontal beta was noted in wakefulness.    Background Slowing:  No generalized background slowing was present.    Focal Slowing:   None were present.    Sleep Background:  Drowsiness was characterized by fragmentation, attenuation, and slowing of the background activity.    Sleep was characterized by the presence of vertex waves, symmetric sleep spindles and K-complexes.    Other Non-Epileptiform Findings:  None were present.    Interictal Epileptiform Activity:   None were present.    Events:  Clinical events: None recorded.  Seizures: None recorded.    Artifacts:  Intermittent myogenic and movement artifacts were noted.    ECG:  The heart rate on single channel ECG was predominantly between 60-70 BPM.    _____________________________________________________________  EEG SUMMARY/CLASSIFICATION    Normal EEG in the awake, drowsy and asleep states.  _____________________________________________________________  EEG IMPRESSION/CLINICAL CORRELATE    Normal EEG study.  No epileptic pattern or seizure seen.          Joe Wagner MD  Epilepsy Attending, [facility] [place]

## 2020-11-22 NOTE — PROGRESS NOTE ADULT - ASSESSMENT
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 Admitted to telemetry for r/o cva vs seizure.

## 2020-11-23 LAB
ANION GAP SERPL CALC-SCNC: 10 MMO/L — SIGNIFICANT CHANGE UP (ref 7–14)
ANION GAP SERPL CALC-SCNC: 10 MMO/L — SIGNIFICANT CHANGE UP (ref 7–14)
BUN SERPL-MCNC: 17 MG/DL — SIGNIFICANT CHANGE UP (ref 7–23)
BUN SERPL-MCNC: 17 MG/DL — SIGNIFICANT CHANGE UP (ref 7–23)
CALCIUM SERPL-MCNC: 9.5 MG/DL — SIGNIFICANT CHANGE UP (ref 8.4–10.5)
CALCIUM SERPL-MCNC: 9.5 MG/DL — SIGNIFICANT CHANGE UP (ref 8.4–10.5)
CHLORIDE SERPL-SCNC: 106 MMOL/L — SIGNIFICANT CHANGE UP (ref 98–107)
CHLORIDE SERPL-SCNC: 106 MMOL/L — SIGNIFICANT CHANGE UP (ref 98–107)
CK SERPL-CCNC: 132 U/L — SIGNIFICANT CHANGE UP (ref 25–170)
CO2 SERPL-SCNC: 23 MMOL/L — SIGNIFICANT CHANGE UP (ref 22–31)
CO2 SERPL-SCNC: 23 MMOL/L — SIGNIFICANT CHANGE UP (ref 22–31)
CREAT SERPL-MCNC: 0.81 MG/DL — SIGNIFICANT CHANGE UP (ref 0.5–1.3)
CREAT SERPL-MCNC: 0.81 MG/DL — SIGNIFICANT CHANGE UP (ref 0.5–1.3)
DSDNA AB SER-ACNC: <12 IU/ML — SIGNIFICANT CHANGE UP
GLUCOSE SERPL-MCNC: 87 MG/DL — SIGNIFICANT CHANGE UP (ref 70–99)
GLUCOSE SERPL-MCNC: 87 MG/DL — SIGNIFICANT CHANGE UP (ref 70–99)
HCT VFR BLD CALC: 43.8 % — SIGNIFICANT CHANGE UP (ref 34.5–45)
HGB BLD-MCNC: 14.1 G/DL — SIGNIFICANT CHANGE UP (ref 11.5–15.5)
MAGNESIUM SERPL-MCNC: 1.9 MG/DL — SIGNIFICANT CHANGE UP (ref 1.6–2.6)
MCHC RBC-ENTMCNC: 30.1 PG — SIGNIFICANT CHANGE UP (ref 27–34)
MCHC RBC-ENTMCNC: 32.2 % — SIGNIFICANT CHANGE UP (ref 32–36)
MCV RBC AUTO: 93.6 FL — SIGNIFICANT CHANGE UP (ref 80–100)
NRBC # FLD: 0 K/UL — SIGNIFICANT CHANGE UP (ref 0–0)
PHOSPHATE SERPL-MCNC: 3.3 MG/DL — SIGNIFICANT CHANGE UP (ref 2.5–4.5)
PLATELET # BLD AUTO: 280 K/UL — SIGNIFICANT CHANGE UP (ref 150–400)
PMV BLD: 10.6 FL — SIGNIFICANT CHANGE UP (ref 7–13)
POTASSIUM SERPL-MCNC: 3.7 MMOL/L — SIGNIFICANT CHANGE UP (ref 3.5–5.3)
POTASSIUM SERPL-MCNC: 3.7 MMOL/L — SIGNIFICANT CHANGE UP (ref 3.5–5.3)
POTASSIUM SERPL-SCNC: 3.7 MMOL/L — SIGNIFICANT CHANGE UP (ref 3.5–5.3)
POTASSIUM SERPL-SCNC: 3.7 MMOL/L — SIGNIFICANT CHANGE UP (ref 3.5–5.3)
RBC # BLD: 4.68 M/UL — SIGNIFICANT CHANGE UP (ref 3.8–5.2)
RBC # FLD: 13.6 % — SIGNIFICANT CHANGE UP (ref 10.3–14.5)
SODIUM SERPL-SCNC: 139 MMOL/L — SIGNIFICANT CHANGE UP (ref 135–145)
SODIUM SERPL-SCNC: 139 MMOL/L — SIGNIFICANT CHANGE UP (ref 135–145)
WBC # BLD: 7.97 K/UL — SIGNIFICANT CHANGE UP (ref 3.8–10.5)
WBC # FLD AUTO: 7.97 K/UL — SIGNIFICANT CHANGE UP (ref 3.8–10.5)

## 2020-11-23 PROCEDURE — 72158 MRI LUMBAR SPINE W/O & W/DYE: CPT | Mod: 26

## 2020-11-23 PROCEDURE — 99232 SBSQ HOSP IP/OBS MODERATE 35: CPT

## 2020-11-23 PROCEDURE — 70552 MRI BRAIN STEM W/DYE: CPT | Mod: 26

## 2020-11-23 RX ORDER — ACETAMINOPHEN 500 MG
2 TABLET ORAL
Qty: 0 | Refills: 0 | DISCHARGE
Start: 2020-11-23

## 2020-11-23 RX ORDER — ATORVASTATIN CALCIUM 80 MG/1
1 TABLET, FILM COATED ORAL
Qty: 30 | Refills: 0
Start: 2020-11-23 | End: 2020-12-22

## 2020-11-23 RX ORDER — POTASSIUM CHLORIDE 20 MEQ
20 PACKET (EA) ORAL ONCE
Refills: 0 | Status: COMPLETED | OUTPATIENT
Start: 2020-11-23 | End: 2020-11-23

## 2020-11-23 RX ORDER — ASPIRIN/CALCIUM CARB/MAGNESIUM 324 MG
1 TABLET ORAL
Qty: 0 | Refills: 0 | DISCHARGE
Start: 2020-11-23

## 2020-11-23 RX ADMIN — AMLODIPINE BESYLATE 10 MILLIGRAM(S): 2.5 TABLET ORAL at 05:35

## 2020-11-23 RX ADMIN — ENOXAPARIN SODIUM 40 MILLIGRAM(S): 100 INJECTION SUBCUTANEOUS at 12:11

## 2020-11-23 RX ADMIN — ATORVASTATIN CALCIUM 80 MILLIGRAM(S): 80 TABLET, FILM COATED ORAL at 21:44

## 2020-11-23 RX ADMIN — BENZOCAINE AND MENTHOL 1 LOZENGE: 5; 1 LIQUID ORAL at 07:24

## 2020-11-23 RX ADMIN — BENZOCAINE AND MENTHOL 1 LOZENGE: 5; 1 LIQUID ORAL at 21:46

## 2020-11-23 RX ADMIN — Medication 1 TABLET(S): at 12:11

## 2020-11-23 RX ADMIN — Medication 20 MILLIEQUIVALENT(S): at 12:52

## 2020-11-23 RX ADMIN — Medication 81 MILLIGRAM(S): at 12:11

## 2020-11-23 RX ADMIN — BENZOCAINE AND MENTHOL 1 LOZENGE: 5; 1 LIQUID ORAL at 17:28

## 2020-11-23 RX ADMIN — LISINOPRIL 20 MILLIGRAM(S): 2.5 TABLET ORAL at 05:35

## 2020-11-23 NOTE — DISCHARGE NOTE NURSING/CASE MANAGEMENT/SOCIAL WORK - PATIENT PORTAL LINK FT
You can access the FollowMyHealth Patient Portal offered by [facility] by registering at the following website: [email or web address]. By joining [facility]’s FollowMyHealth portal, you will also be able to view your health information using other applications (apps) compatible with our system.

## 2020-11-23 NOTE — DISCHARGE NOTE PROVIDER - NSDCCPCAREPLAN_GEN_ALL_CORE_FT
PRINCIPAL DISCHARGE DIAGNOSIS  Diagnosis: Weakness  Assessment and Plan of Treatment: you were evaluated for both seizures and stroke-like symptoms.   You received imaging of your brain which did not show any abnormalities.  You should follow up with your primary care physician in 1-2 weeks       PRINCIPAL DISCHARGE DIAGNOSIS  Diagnosis: Weakness  Assessment and Plan of Treatment: you were evaluated for both seizures and stroke-like symptoms.   You received imaging of your brain which did not show any abnormalities.  You should follow up with your primary care physician in 1-2 weeks      SECONDARY DISCHARGE DIAGNOSES  Diagnosis: Seizure  Assessment and Plan of Treatment: You were evaluated for possible seizures, however the imaging that was completed to evaluate for seizure activity did not show active seizure activity.  Please follow up with your neurologist if you have any questions or new related developments.     PRINCIPAL DISCHARGE DIAGNOSIS  Diagnosis: Weakness  Assessment and Plan of Treatment: you were evaluated for both seizures and stroke-like symptoms. You received imaging of your brain and lumbar spine which did not any acute findings. You should follow up with your primary care physician in 1-2 weeks, and neurology.      SECONDARY DISCHARGE DIAGNOSES  Diagnosis: Seizure  Assessment and Plan of Treatment: You were evaluated for possible seizures, however the imaging that was completed to evaluate for seizure activity did not show active seizure activity.  Please follow up with your neurologist if you have any questions or new related developments.

## 2020-11-23 NOTE — DISCHARGE NOTE PROVIDER - NSDCFUADDAPPT_GEN_ALL_CORE_FT
Please follow up with your primary care provider, noted in your h&p is JAIDEN Canada who works at your primary care office that recently transitioned to an urgent care facility.

## 2020-11-23 NOTE — DISCHARGE NOTE PROVIDER - CARE PROVIDER_API CALL
Joe Nance ([place])  Physician Assistant Services  [address] [place] [address]  Phone: [phone]  Fax: [phone]  Established Patient  Follow Up Time:    Joe Nance)  Physician Assistant Services  [address] [place] [address]  Phone: [phone]  Fax: [phone]  Established Patient  Follow Up Time:     Imer Gomez)  Neurology  [address] [facility] [address], [address]  [place] [address]  Phone: [phone]  Fax: [phone]  Follow Up Time:

## 2020-11-23 NOTE — PROGRESS NOTE ADULT - ASSESSMENT
70F hx of CVA (1995, no residual deficits), Neurosarcoidosis (not on medications), seizure (last 1995), HTN, who presents with right upper and lower extremity pain and weakness x 1 month. Neuro work up negative thus far (MR brain noncon, EEG), pending further imaging (MR brain with contrast, MR lumbar spine).

## 2020-11-23 NOTE — PROGRESS NOTE ADULT - PROBLEM SELECTOR PLAN 1
- p/w RUE/RLE pain and weakness   - MR brain non con with no acute findings, EEG negative for seizures, TTE neg  - d/w neuro: obtain MR lumbar spine noncon, MR brain with contrast, ACE, CK level   - c/w asa and statin, appreciate neuro input

## 2020-11-23 NOTE — DISCHARGE NOTE PROVIDER - HOSPITAL COURSE
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 Admitted to telemetry for r/o cva vs seizure.    Weakness.    - MRI with no acute findings. EEG without abnormalities  Given hx of CVA, c/w ASA and Statin  Echo w/bubble study with no acute findings.     HTN (hypertension).    - monitor bp, cont meds, adjust as needed.     Seizure.    -not on any home medications, last seizure was 1995, VEEG ordered as per neurology.     Neurosarcoidosis.    -outpt Rheum follow up.     Need for prophylactic measure.    -Lovenox 40mg sc daily.       On 11/23/20, case was discussed with , patient is medically cleared and optimized for discharge today. 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 Admitted to telemetry for r/o cva vs seizure.    Weakness.    MRI Brain, Lumbar spine with no acute findings. EEG without abnormalities  Given hx of CVA, c/w ASA and Statin  Echo w/bubble study with no acute findings.     HTN (hypertension).    - monitor bp, cont meds, adjust as needed.     Seizure.    -not on any home medications, last seizure was 1995, VEEG ordered as per neurology.     Neurosarcoidosis.    -outpt Rheum follow up.     Need for prophylactic measure.    -Lovenox 40mg sc daily.       On 11/23/20, case was discussed with , patient is medically cleared and optimized for discharge today. All medications were reviewed with attending, and sent to mutually agreed upon pharmacy

## 2020-11-23 NOTE — DISCHARGE NOTE PROVIDER - PROVIDER TOKENS
PROVIDER:[TOKEN:[[id number]],ESTABLISHEDPATIENT:[T]] PROVIDER:[TOKEN:[[id number]],ESTABLISHEDPATIENT:[T]],PROVIDER:[TOKEN:[[id number]]]

## 2020-11-23 NOTE — DISCHARGE NOTE PROVIDER - NSDCMRMEDTOKEN_GEN_ALL_CORE_FT
amlodipine-benazepril 10 mg-20 mg oral capsule: 1 cap(s) orally once a day  Multiple Vitamins oral tablet: 1 tab(s) orally once a day   acetaminophen 325 mg oral tablet: 2 tab(s) orally every 6 hours, As needed, Temp greater or equal to 38C (100.4F), Mild Pain (1 - 3), Moderate Pain (4 - 6)  amlodipine-benazepril 10 mg-20 mg oral capsule: 1 cap(s) orally once a day  aspirin 81 mg oral delayed release tablet: 1 tab(s) orally once a day  atorvastatin 80 mg oral tablet: 1 tab(s) orally once a day (at bedtime)  Multiple Vitamins oral tablet: 1 tab(s) orally once a day   acetaminophen 325 mg oral tablet: 2 tab(s) orally every 6 hours, As needed, Temp greater or equal to 38C (100.4F), Mild Pain (1 - 3), Moderate Pain (4 - 6)  amlodipine-benazepril 10 mg-20 mg oral capsule: 1 cap(s) orally once a day  aspirin 81 mg oral delayed release tablet: 1 tab(s) orally once a day  atorvastatin 80 mg oral tablet: 1 tab(s) orally once a day (at bedtime)  Multiple Vitamins oral tablet: 1 tab(s) orally once a day  Physical Therapy : Apply topically to affected area once a day  Outpatient physical therapy secondary to weakness, per inpatient PT recommendations

## 2020-11-23 NOTE — DISCHARGE NOTE NURSING/CASE MANAGEMENT/SOCIAL WORK - NSDCPEPTSTRK_GEN_ALL_CORE
Prescribed medications/Risk factors for stroke/Stroke support groups for patients, families, and friends/Stroke warning signs and symptoms/Signs and symptoms of stroke/Call 911 for stroke/Need for follow up after discharge/Stroke education booklet
All other review of systems negative, except as noted in HPI

## 2020-11-23 NOTE — PROGRESS NOTE ADULT - SUBJECTIVE AND OBJECTIVE BOX
Anais Fierro MD  [facility] Division of Hospital Medicine  Pager [phone] (M-F 8AM-5PM)  Other Times: [id number]    Patient is a 70y old  Female who presents with a chief complaint of Right sided weakness (23 Nov 2020 10:23)    SUBJECTIVE / OVERNIGHT EVENTS: Patient feeling well this morning, asking when she can go home    MEDICATIONS  (STANDING):  amLODIPine   Tablet 10 milliGRAM(s) Oral daily  aspirin enteric coated 81 milliGRAM(s) Oral daily  atorvastatin 80 milliGRAM(s) Oral at bedtime  enoxaparin Injectable 40 milliGRAM(s) SubCutaneous daily  lisinopril 20 milliGRAM(s) Oral daily  multivitamin 1 Tablet(s) Oral daily    MEDICATIONS  (PRN):  acetaminophen   Tablet .. 650 milliGRAM(s) Oral every 6 hours PRN Temp greater or equal to 38C (100.4F), Mild Pain (1 - 3), Moderate Pain (4 - 6)  benzocaine 15 mG/menthol 3.6 mG (Sugar-Free) Lozenge 1 Lozenge Oral every 4 hours PRN Sore Throat      PHYSICAL EXAM:  Vital Signs Last 24 Hrs  T(C): 37.2 (23 Nov 2020 12:10), Max: 37.2 (23 Nov 2020 12:10)  T(F): 99 (23 Nov 2020 12:10), Max: 99 (23 Nov 2020 12:10)  HR: 100 (23 Nov 2020 12:10) (85 - 100)  BP: 132/84 (23 Nov 2020 12:10) (115/73 - 132/84)  RR: 16 (23 Nov 2020 12:10) (16 - 17)  SpO2: 99% (23 Nov 2020 12:10) (97% - 99%)    CONSTITUTIONAL: NAD, well-developed, well-groomed  RESPIRATORY: Normal respiratory effort; lungs are clear to auscultation bilaterally  CARDIOVASCULAR: Regular rate and rhythm, normal S1 and S2, no murmur/rub/gallop; No lower extremity edema  GASTROINTESTINAL: Nontender to palpation, normoactive bowel sounds, no rebound/guarding; No hepatosplenomegaly  MUSCULOSKELETAL:  no clubbing or cyanosis of digits; no joint swelling or tenderness to palpation  NEUROLOGY: non-focal; no gross sensory deficits   PSYCH: A+O to person, place, and time; affect appropriate  SKIN: No rashes; warm     LABS:                        14.1   7.97  )-----------( 280      ( 23 Nov 2020 05:00 )             43.8     11-23    139  |  106  |  17  ----------------------------<  87  3.7   |  23  |  0.81    Ca    9.5      23 Nov 2020 05:00  Phos  3.3     11-23  Mg     1.9     11-23        CARDIAC MARKERS ( 23 Nov 2020 05:00 )  x     / x     / 132 u/L / x     / x                RADIOLOGY & ADDITIONAL TESTS:  Results Reviewed:   Imaging Personally Reviewed:  Electrocardiogram Personally Reviewed:    COORDINATION OF CARE:  Care Discussed with Consultants/Other Providers [Y/N]: Y d/w neurology resident, requesting MR brain with contrast, and lumbar spine without contrast for further workup  Prior or Outpatient Records Reviewed [Y/N]:

## 2020-11-24 VITALS
DIASTOLIC BLOOD PRESSURE: 100 MMHG | SYSTOLIC BLOOD PRESSURE: 149 MMHG | OXYGEN SATURATION: 100 % | HEART RATE: 99 BPM | TEMPERATURE: 98 F | RESPIRATION RATE: 16 BRPM

## 2020-11-24 LAB
ANA TITR SER: NEGATIVE — SIGNIFICANT CHANGE UP
ANION GAP SERPL CALC-SCNC: 10 MMO/L — SIGNIFICANT CHANGE UP (ref 7–14)
BUN SERPL-MCNC: 18 MG/DL — SIGNIFICANT CHANGE UP (ref 7–23)
C-ANCA SER-ACNC: NEGATIVE — SIGNIFICANT CHANGE UP
CALCIUM SERPL-MCNC: 10.1 MG/DL — SIGNIFICANT CHANGE UP (ref 8.4–10.5)
CHLORIDE SERPL-SCNC: 106 MMOL/L — SIGNIFICANT CHANGE UP (ref 98–107)
CO2 SERPL-SCNC: 24 MMOL/L — SIGNIFICANT CHANGE UP (ref 22–31)
CREAT SERPL-MCNC: 0.82 MG/DL — SIGNIFICANT CHANGE UP (ref 0.5–1.3)
GLUCOSE SERPL-MCNC: 88 MG/DL — SIGNIFICANT CHANGE UP (ref 70–99)
MAGNESIUM SERPL-MCNC: 1.8 MG/DL — SIGNIFICANT CHANGE UP (ref 1.6–2.6)
P-ANCA SER-ACNC: NEGATIVE — SIGNIFICANT CHANGE UP
PHOSPHATE SERPL-MCNC: 3 MG/DL — SIGNIFICANT CHANGE UP (ref 2.5–4.5)
POTASSIUM SERPL-MCNC: 4.1 MMOL/L — SIGNIFICANT CHANGE UP (ref 3.5–5.3)
POTASSIUM SERPL-SCNC: 4.1 MMOL/L — SIGNIFICANT CHANGE UP (ref 3.5–5.3)
SODIUM SERPL-SCNC: 140 MMOL/L — SIGNIFICANT CHANGE UP (ref 135–145)

## 2020-11-24 PROCEDURE — 99239 HOSP IP/OBS DSCHRG MGMT >30: CPT

## 2020-11-24 RX ADMIN — LISINOPRIL 20 MILLIGRAM(S): 2.5 TABLET ORAL at 05:08

## 2020-11-24 RX ADMIN — AMLODIPINE BESYLATE 10 MILLIGRAM(S): 2.5 TABLET ORAL at 05:08

## 2020-11-24 RX ADMIN — Medication 1 TABLET(S): at 11:56

## 2020-11-24 RX ADMIN — Medication 81 MILLIGRAM(S): at 11:56

## 2020-11-24 RX ADMIN — BENZOCAINE AND MENTHOL 1 LOZENGE: 5; 1 LIQUID ORAL at 05:09

## 2020-11-24 NOTE — PROGRESS NOTE ADULT - PROBLEM SELECTOR PLAN 1
- p/w RUE/RLE pain and weakness   - MR brain non con with no acute findings, EEG negative for seizures, TTE neg  - MR lumbar spine and brain with no acute findings    - c/w asa and statin, appreciate neuro input

## 2020-11-24 NOTE — PROGRESS NOTE ADULT - ATTENDING COMMENTS
Patient seen and examined. VSS. Discussed with neurology, patient stable for discharge with outpatient followup. Total 37 minutes spent on discharge planning.

## 2020-11-24 NOTE — PROGRESS NOTE ADULT - SUBJECTIVE AND OBJECTIVE BOX
Anais Fierro MD  [facility] Division of Hospital Medicine  Pager [phone] (M-F 8AM-5PM)  Other Times: [id number]    Patient is a 70y old  Female who presents with a chief complaint of Right sided weakness (24 Nov 2020 09:50)    SUBJECTIVE / OVERNIGHT EVENTS: Patient feeling well, no complaints.     MEDICATIONS  (STANDING):  amLODIPine   Tablet 10 milliGRAM(s) Oral daily  aspirin enteric coated 81 milliGRAM(s) Oral daily  atorvastatin 80 milliGRAM(s) Oral at bedtime  enoxaparin Injectable 40 milliGRAM(s) SubCutaneous daily  lisinopril 20 milliGRAM(s) Oral daily  multivitamin 1 Tablet(s) Oral daily    MEDICATIONS  (PRN):  acetaminophen   Tablet .. 650 milliGRAM(s) Oral every 6 hours PRN Temp greater or equal to 38C (100.4F), Mild Pain (1 - 3), Moderate Pain (4 - 6)  benzocaine 15 mG/menthol 3.6 mG (Sugar-Free) Lozenge 1 Lozenge Oral every 4 hours PRN Sore Throat    PHYSICAL EXAM:  Vital Signs Last 24 Hrs  T(C): 36.6 (24 Nov 2020 11:55), Max: 37.2 (23 Nov 2020 12:10)  T(F): 97.8 (24 Nov 2020 11:55), Max: 99 (23 Nov 2020 12:10)  HR: 99 (24 Nov 2020 11:55) (80 - 100)  BP: 149/100 (24 Nov 2020 11:55) (119/75 - 149/100)  RR: 16 (24 Nov 2020 11:55) (16 - 17)  SpO2: 100% (24 Nov 2020 11:55) (98% - 100%)    CONSTITUTIONAL: NAD, well-developed, well-groomed  RESPIRATORY: Normal respiratory effort; lungs are clear to auscultation bilaterally  CARDIOVASCULAR: Regular rate and rhythm, normal S1 and S2, no murmur/rub/gallop; No lower extremity edema  GASTROINTESTINAL: Nontender to palpation, normoactive bowel sounds, no rebound/guarding; No hepatosplenomegaly  MUSCULOSKELETAL:  no clubbing or cyanosis of digits; no joint swelling or tenderness to palpation  NEUROLOGY: non-focal; no gross sensory deficits   PSYCH: A+O to person, place, and time; affect appropriate  SKIN: No rashes; warm       LABS:                        14.1   7.97  )-----------( 280      ( 23 Nov 2020 05:00 )             43.8     11-24    140  |  106  |  18  ----------------------------<  88  4.1   |  24  |  0.82    Ca    10.1      24 Nov 2020 05:45  Phos  3.0     11-24  Mg     1.8     11-24        CARDIAC MARKERS ( 23 Nov 2020 05:00 )  x     / x     / 132 u/L / x     / x                RADIOLOGY & ADDITIONAL TESTS:  Results Reviewed:   Imaging Personally Reviewed:  Electrocardiogram Personally Reviewed:    COORDINATION OF CARE:  Care Discussed with Consultants/Other Providers [Y/N]:  Prior or Outpatient Records Reviewed [Y/N]:

## 2020-11-24 NOTE — PROGRESS NOTE ADULT - REASON FOR ADMISSION
Right sided weakness

## 2020-11-24 NOTE — PROGRESS NOTE ADULT - ASSESSMENT
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 Exam initially  w/ R. hemiparesis, pain limited that has since resolved. MRI Brain w/ and w/o and MRI Lumbar spine fail to demonstrate reason for transient paresis. EEG without abnormality. 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 Exam initially  w/ R. hemiparesis, pain limited that has since resolved. MRI Brain w/ and w/o and MRI Lumbar spine fail to demonstrate reason for transient paresis. EEG without abnormality. CK within normal limits    Plan:     - Follow up ACE levels in outpatient setting  - No further inpatient neurological work up required  - Can Follow up with Dr. Gomez [phone]

## 2020-11-24 NOTE — PROGRESS NOTE ADULT - SUBJECTIVE AND OBJECTIVE BOX
INTERVAL HISTORY:    Patient interviewed and examined at the bedside on the morning of 11/24/20.    PAST MEDICAL & SURGICAL HISTORY:  Neurosarcoidosis    CVA (cerebral vascular accident)  1995 - states was in a coma for a couple of days    HTN (hypertension)    Seizure  1995    Status post emergency tracheotomy for assistance in breathing  as a child due to an obstruction that was later corrected    History of tonsillectomy      FAMILY HISTORY:  No pertinent family history in first degree relatives    MEDICATIONS (HOME):  Home Medications:  acetaminophen 325 mg oral tablet: 2 tab(s) orally every 6 hours, As needed, Temp greater or equal to 38C (100.4F), Mild Pain (1 - 3), Moderate Pain (4 - 6) (23 Nov 2020 10:46)  amlodipine-benazepril 10 mg-20 mg oral capsule: 1 cap(s) orally once a day (20 Nov 2020 09:11)  aspirin 81 mg oral delayed release tablet: 1 tab(s) orally once a day (23 Nov 2020 10:46)  Multiple Vitamins oral tablet: 1 tab(s) orally once a day (20 Nov 2020 09:11)    MEDICATIONS  (STANDING):  amLODIPine   Tablet 10 milliGRAM(s) Oral daily  aspirin enteric coated 81 milliGRAM(s) Oral daily  atorvastatin 80 milliGRAM(s) Oral at bedtime  enoxaparin Injectable 40 milliGRAM(s) SubCutaneous daily  lisinopril 20 milliGRAM(s) Oral daily  multivitamin 1 Tablet(s) Oral daily    MEDICATIONS  (PRN):  acetaminophen   Tablet .. 650 milliGRAM(s) Oral every 6 hours PRN Temp greater or equal to 38C (100.4F), Mild Pain (1 - 3), Moderate Pain (4 - 6)  benzocaine 15 mG/menthol 3.6 mG (Sugar-Free) Lozenge 1 Lozenge Oral every 4 hours PRN Sore Throat    ALLERGIES/INTOLERANCES:  Allergies  No Known Allergies    Intolerances    VITALS & EXAMINATION:  Vital Signs Last 24 Hrs  T(C): 36.7 (24 Nov 2020 05:05), Max: 37.2 (23 Nov 2020 12:10)  T(F): 98 (24 Nov 2020 05:05), Max: 99 (23 Nov 2020 12:10)  HR: 80 (24 Nov 2020 05:05) (80 - 100)  BP: 131/73 (24 Nov 2020 05:05) (119/75 - 132/84)  BP(mean): --  RR: 17 (24 Nov 2020 05:05) (16 - 17)  SpO2: 99% (24 Nov 2020 05:05) (98% - 100%)    Exam:   MS: Oriented x3. Fluent. Follows crossed commands.   CN: VFF. EOMI. V1-V3 intact. Face symmetric. T/u midline.   Motor: KING, no proximal muscle weakness  RUE: 5/5  LUE: 5/5  LLE: 5/5  RLE: 5/5    Reflexes: 1+ throughout. Babinski absent bilaterally.   Coordination: No dysmetria on FNF or ataxia on HTS bilaterally   Gait: Deferred      LABORATORY:  CBC                       14.1   7.97  )-----------( 280      ( 23 Nov 2020 05:00 )             43.8     Chem 11-24    140  |  106  |  18  ----------------------------<  88  4.1   |  24  |  0.82    Ca    10.1      24 Nov 2020 05:45  Phos  3.0     11-24  Mg     1.8     11-24      LFTs   Coagulopathy   Lipid Panel 11-20 Chol 218<H>  HDL 71<H> Trig 83  A1c   Cardiac enzymes CARDIAC MARKERS ( 23 Nov 2020 05:00 )  x     / x     / 132 u/L / x     / x        Radiology (XR, CT, MR, U/S, TTE/HENNY)    `EEG SUMMARY/CLASSIFICATION    Normal EEG in the awake, drowsy and asleep states.  _____________________________________________________________  EEG IMPRESSION/CLINICAL CORRELATE    Normal EEG study.  No epileptic pattern or seizure seen.        CT Head No Cont (11.19.20 @ 20:56)       There is no acute intracranial hemorrhage or mass effect. Area of encephalomalacia within the inferior anterior left frontal lobe likely the sequela of prior trauma or infarct. Additional patchy and confluent areas of low-attenuation in bihemispheric white matter nonspecific but likely the sequela of moderate chronic microvascular changes.    The ventricles, sulci and cisternal spaces are unremarkable in size and configuration for the patient's stated age. There is no midline shift or abnormal extra-axial fluid collection.    The calvarium is intact. The visualized portions of the paranasal sinuses and mastoid air cells are clear.    IMPRESSION:    No acute intracranial hemorrhage, vasogenic edema or extra-axial fluid collection. Sequela of prior trauma or infarct inferior left frontal lobe and moderate chronic microvascular change.        < from: MR Head No Cont (11.20.20 @ 10:55) >  IMPRESSION:    No mass, hemorrhage or evidence of acute cerebral ischemia.    Chronic left frontal lobe encephalomalacia likely representing a posttraumatic contusion with ex vacuo dilatation of the left frontal horn.    Chronic microvascular changes.        MR Head w/ IV Cont (11.23.20 @ 20:01)     IMPRESSION: Parenchymal volume loss and chronic microvascular ischemic changes are identified.    Scoliosis involving left frontal region.    MR of the lumbar spine was performed using sagittal T1-T2 and STIR sequence. Axial T1 and T2-weighted sequences were performed. The patient was injected with Gadavist IV and sagittal T1-weighted sequence with fat suppression was performed. Axial T1-weighted sequence was performed.    Mild loss of the normal lumbar lordosis seen.    There is a grade 1 anterolisthesis seen involving L4 on L5.    The vertebral body height alignment and marrow signal appear normal    Disc desiccation is seen involving the lumbar spine region.    T12-L1: Normal    L1-2: Normal    L2-3: Bilateral hypertrophic facet changes are seen. No significant compromise of the spinal canal or either neural foramen.    L3-4: Disc bulge and bilateral hypertrophic facet joint changes seen. No significant compromise of the spinal canal or either    L4-5: Disc bulge and bilateral hypertrophic facet changes are seen. Mild to moderate narrowing of the spinal canal    L5-S1: Disc bulge and bilateral hypertrophic facet changes are seen. Mild to moderate narrowing of the spinal canal and mild narrowing of both neural foramen.    There is evidence of T1 shortening seen involving the lower sacrum and coccyx region. This could be compatible with radiation changes in the correct clinical setting. Please correlate clinically.    The conus ends at bottom of L1 and appears normal    Evaluation of the paraspinal soft tissues appear normal.    IMPRESSION: Degenerative changes as described above.

## 2020-11-25 LAB — ACE SERPL-CCNC: 5 U/L — LOW (ref 14–82)

## 2021-12-21 ENCOUNTER — INPATIENT (INPATIENT)
Facility: HOSPITAL | Age: 71
LOS: 2 days | Discharge: HOME | End: 2021-12-24
Attending: GENERAL ACUTE CARE HOSPITAL | Admitting: GENERAL ACUTE CARE HOSPITAL
Payer: SELF-PAY

## 2021-12-21 VITALS
DIASTOLIC BLOOD PRESSURE: 106 MMHG | HEIGHT: 62 IN | RESPIRATION RATE: 16 BRPM | SYSTOLIC BLOOD PRESSURE: 149 MMHG | TEMPERATURE: 98 F | OXYGEN SATURATION: 100 % | HEART RATE: 97 BPM

## 2021-12-21 DIAGNOSIS — Z93.0 TRACHEOSTOMY STATUS: Chronic | ICD-10-CM

## 2021-12-21 DIAGNOSIS — Z90.89 ACQUIRED ABSENCE OF OTHER ORGANS: Chronic | ICD-10-CM

## 2021-12-21 PROCEDURE — 99285 EMERGENCY DEPT VISIT HI MDM: CPT

## 2021-12-21 NOTE — ED PROVIDER NOTE - ATTENDING CONTRIBUTION TO CARE
agree with PA note    "· HPI Objective Statement: 70 y/o F h/o HTN, dementia p/w chest pain and sob x 5 days. Pt reports exertional chest heaviness and LIND. States walking around her house she worsens symptoms. Admits to mild dizziness. No headache. Admits to mild dry cough. Pt denies fever, chills, abd pain, n/v/d. No known sick contacts. No recent travel.  "    PE: well appearing; VSS: CTAB/L; s1 s2 no m/r/g abd soft/NT/ND ext: no edema Neuro: CNs intact 5/5 motor UE and LE; sensation intact    Imp: elderly female who presents with CP and LIND  labs, cxr, trop, admit

## 2021-12-21 NOTE — ED ADULT NURSE NOTE - OBJECTIVE STATEMENT
Pt. presented to room 13. Pt. A&Ox3 ambulatory. Pt. w/ daughter d/t dementia. Pt. c/o chest pain SOB and r. sided weakness since saturday. Pt. went to urgent care today and they told her to come to the ED. Pt. NSR on monitor. Pt. vitally stable. currently denying CP or SOB while in [place]. denies fever, chills, n/v/d abd pain. RAC 20 G labs sent covid sent.

## 2021-12-21 NOTE — ED PROVIDER NOTE - CLINICAL SUMMARY MEDICAL DECISION MAKING FREE TEXT BOX
72 y/o F h/o HTN, dementia p/w exertional chest pain and sob x 5 days.   plan  - labs  - trop  - ekg  - cxr  - bnp  - admit

## 2021-12-21 NOTE — ED PROVIDER NOTE - OBJECTIVE STATEMENT
70 y/o F h/o HTN, dementia p/w chest pain and sob x 5 days. Pt reports exertional chest heaviness and LIND. States walking around her house she worsens symptoms. Admits to mild dizziness. No headache. Admits to mild dry cough. Pt denies fever, chills, abd pain, n/v/d. No known sick contacts. No recent travel.

## 2021-12-21 NOTE — ED ADULT TRIAGE NOTE - NS ED TRIAGE AVPU SCALE
Alert-The patient is alert, awake and responds to voice. The patient is oriented to time, place, and person. The triage nurse is able to obtain subjective information.
Unable to assess

## 2021-12-22 DIAGNOSIS — I63.9 CEREBRAL INFARCTION, UNSPECIFIED: ICD-10-CM

## 2021-12-22 DIAGNOSIS — R07.9 CHEST PAIN, UNSPECIFIED: ICD-10-CM

## 2021-12-22 DIAGNOSIS — D86.89 SARCOIDOSIS OF OTHER SITES: ICD-10-CM

## 2021-12-22 DIAGNOSIS — Z29.9 ENCOUNTER FOR PROPHYLACTIC MEASURES, UNSPECIFIED: ICD-10-CM

## 2021-12-22 DIAGNOSIS — I10 ESSENTIAL (PRIMARY) HYPERTENSION: ICD-10-CM

## 2021-12-22 PROBLEM — R56.9 UNSPECIFIED CONVULSIONS: Chronic | Status: ACTIVE | Noted: 2020-11-19

## 2021-12-22 LAB
A1C WITH ESTIMATED AVERAGE GLUCOSE RESULT: 5.3 % — SIGNIFICANT CHANGE UP (ref 4–5.6)
ALBUMIN SERPL ELPH-MCNC: 3.8 G/DL — SIGNIFICANT CHANGE UP (ref 3.3–5)
ALP SERPL-CCNC: 105 U/L — SIGNIFICANT CHANGE UP (ref 40–120)
ALT FLD-CCNC: 10 U/L — SIGNIFICANT CHANGE UP (ref 4–33)
ANION GAP SERPL CALC-SCNC: 13 MMOL/L — SIGNIFICANT CHANGE UP (ref 7–14)
ANION GAP SERPL CALC-SCNC: 14 MMOL/L — SIGNIFICANT CHANGE UP (ref 7–14)
AST SERPL-CCNC: 27 U/L — SIGNIFICANT CHANGE UP (ref 4–32)
BASE EXCESS BLDV CALC-SCNC: 0.9 MMOL/L — SIGNIFICANT CHANGE UP (ref -2–3)
BASOPHILS # BLD AUTO: 0.07 K/UL — SIGNIFICANT CHANGE UP (ref 0–0.2)
BASOPHILS NFR BLD AUTO: 0.7 % — SIGNIFICANT CHANGE UP (ref 0–2)
BILIRUB SERPL-MCNC: 0.9 MG/DL — SIGNIFICANT CHANGE UP (ref 0.2–1.2)
BLOOD GAS VENOUS COMPREHENSIVE RESULT: SIGNIFICANT CHANGE UP
BUN SERPL-MCNC: 12 MG/DL — SIGNIFICANT CHANGE UP (ref 7–23)
BUN SERPL-MCNC: 15 MG/DL — SIGNIFICANT CHANGE UP (ref 7–23)
CALCIUM SERPL-MCNC: 9.7 MG/DL — SIGNIFICANT CHANGE UP (ref 8.4–10.5)
CALCIUM SERPL-MCNC: 9.7 MG/DL — SIGNIFICANT CHANGE UP (ref 8.4–10.5)
CHLORIDE BLDV-SCNC: 109 MMOL/L — HIGH (ref 96–108)
CHLORIDE SERPL-SCNC: 105 MMOL/L — SIGNIFICANT CHANGE UP (ref 98–107)
CHLORIDE SERPL-SCNC: 108 MMOL/L — HIGH (ref 98–107)
CHOLEST SERPL-MCNC: 201 MG/DL — HIGH
CO2 BLDV-SCNC: 28.6 MMOL/L — HIGH (ref 22–26)
CO2 SERPL-SCNC: 21 MMOL/L — LOW (ref 22–31)
CO2 SERPL-SCNC: 22 MMOL/L — SIGNIFICANT CHANGE UP (ref 22–31)
CREAT SERPL-MCNC: 0.85 MG/DL — SIGNIFICANT CHANGE UP (ref 0.5–1.3)
CREAT SERPL-MCNC: 0.85 MG/DL — SIGNIFICANT CHANGE UP (ref 0.5–1.3)
D DIMER BLD IA.RAPID-MCNC: 225 NG/ML DDU — SIGNIFICANT CHANGE UP
EOSINOPHIL # BLD AUTO: 0.11 K/UL — SIGNIFICANT CHANGE UP (ref 0–0.5)
EOSINOPHIL NFR BLD AUTO: 1.1 % — SIGNIFICANT CHANGE UP (ref 0–6)
ESTIMATED AVERAGE GLUCOSE: 105 — SIGNIFICANT CHANGE UP
FLUAV AG NPH QL: SIGNIFICANT CHANGE UP
FLUBV AG NPH QL: SIGNIFICANT CHANGE UP
GAS PNL BLDV: 141 MMOL/L — SIGNIFICANT CHANGE UP (ref 136–145)
GLUCOSE BLDV-MCNC: 89 MG/DL — SIGNIFICANT CHANGE UP (ref 70–99)
GLUCOSE SERPL-MCNC: 81 MG/DL — SIGNIFICANT CHANGE UP (ref 70–99)
GLUCOSE SERPL-MCNC: 89 MG/DL — SIGNIFICANT CHANGE UP (ref 70–99)
HCO3 BLDV-SCNC: 27 MMOL/L — SIGNIFICANT CHANGE UP (ref 22–29)
HCT VFR BLD CALC: 44.6 % — SIGNIFICANT CHANGE UP (ref 34.5–45)
HCT VFR BLDA CALC: 44 % — SIGNIFICANT CHANGE UP (ref 34.5–46.5)
HDLC SERPL-MCNC: 59 MG/DL — SIGNIFICANT CHANGE UP
HGB BLD CALC-MCNC: 14.5 G/DL — SIGNIFICANT CHANGE UP (ref 11.5–15.5)
HGB BLD-MCNC: 14.5 G/DL — SIGNIFICANT CHANGE UP (ref 11.5–15.5)
IANC: 5.45 K/UL — SIGNIFICANT CHANGE UP (ref 1.5–8.5)
IMM GRANULOCYTES NFR BLD AUTO: 0.3 % — SIGNIFICANT CHANGE UP (ref 0–1.5)
INR BLD: 1.15 RATIO — SIGNIFICANT CHANGE UP (ref 0.88–1.16)
LACTATE BLDV-MCNC: 1.7 MMOL/L — SIGNIFICANT CHANGE UP (ref 0.5–2)
LIDOCAIN IGE QN: 28 U/L — SIGNIFICANT CHANGE UP (ref 7–60)
LIPID PNL WITH DIRECT LDL SERPL: 130 MG/DL — HIGH
LYMPHOCYTES # BLD AUTO: 3.51 K/UL — HIGH (ref 1–3.3)
LYMPHOCYTES # BLD AUTO: 34.9 % — SIGNIFICANT CHANGE UP (ref 13–44)
MAGNESIUM SERPL-MCNC: 2.1 MG/DL — SIGNIFICANT CHANGE UP (ref 1.6–2.6)
MCHC RBC-ENTMCNC: 31.2 PG — SIGNIFICANT CHANGE UP (ref 27–34)
MCHC RBC-ENTMCNC: 32.5 GM/DL — SIGNIFICANT CHANGE UP (ref 32–36)
MCV RBC AUTO: 95.9 FL — SIGNIFICANT CHANGE UP (ref 80–100)
MONOCYTES # BLD AUTO: 0.9 K/UL — SIGNIFICANT CHANGE UP (ref 0–0.9)
MONOCYTES NFR BLD AUTO: 8.9 % — SIGNIFICANT CHANGE UP (ref 2–14)
NEUTROPHILS # BLD AUTO: 5.45 K/UL — SIGNIFICANT CHANGE UP (ref 1.8–7.4)
NEUTROPHILS NFR BLD AUTO: 54.1 % — SIGNIFICANT CHANGE UP (ref 43–77)
NON HDL CHOLESTEROL: 142 MG/DL — HIGH
NRBC # BLD: 0 /100 WBCS — SIGNIFICANT CHANGE UP
NRBC # FLD: 0 K/UL — SIGNIFICANT CHANGE UP
NT-PROBNP SERPL-SCNC: 46 PG/ML — SIGNIFICANT CHANGE UP
PCO2 BLDV: 48 MMHG — HIGH (ref 39–42)
PH BLDV: 7.36 — SIGNIFICANT CHANGE UP (ref 7.32–7.43)
PHOSPHATE SERPL-MCNC: 3 MG/DL — SIGNIFICANT CHANGE UP (ref 2.5–4.5)
PLATELET # BLD AUTO: 259 K/UL — SIGNIFICANT CHANGE UP (ref 150–400)
PO2 BLDV: 35 MMHG — SIGNIFICANT CHANGE UP
POTASSIUM BLDV-SCNC: 4.3 MMOL/L — SIGNIFICANT CHANGE UP (ref 3.5–5.1)
POTASSIUM SERPL-MCNC: 3.8 MMOL/L — SIGNIFICANT CHANGE UP (ref 3.5–5.3)
POTASSIUM SERPL-MCNC: 4.8 MMOL/L — SIGNIFICANT CHANGE UP (ref 3.5–5.3)
POTASSIUM SERPL-SCNC: 3.8 MMOL/L — SIGNIFICANT CHANGE UP (ref 3.5–5.3)
POTASSIUM SERPL-SCNC: 4.8 MMOL/L — SIGNIFICANT CHANGE UP (ref 3.5–5.3)
PROT SERPL-MCNC: 7.5 G/DL — SIGNIFICANT CHANGE UP (ref 6–8.3)
PROTHROM AB SERPL-ACNC: 13 SEC — SIGNIFICANT CHANGE UP (ref 10.6–13.6)
RBC # BLD: 4.65 M/UL — SIGNIFICANT CHANGE UP (ref 3.8–5.2)
RBC # FLD: 13.2 % — SIGNIFICANT CHANGE UP (ref 10.3–14.5)
SAO2 % BLDV: 64.9 % — SIGNIFICANT CHANGE UP
SARS-COV-2 RNA SPEC QL NAA+PROBE: SIGNIFICANT CHANGE UP
SODIUM SERPL-SCNC: 141 MMOL/L — SIGNIFICANT CHANGE UP (ref 135–145)
SODIUM SERPL-SCNC: 142 MMOL/L — SIGNIFICANT CHANGE UP (ref 135–145)
TRIGL SERPL-MCNC: 58 MG/DL — SIGNIFICANT CHANGE UP
TROPONIN T, HIGH SENSITIVITY RESULT: <6 NG/L — SIGNIFICANT CHANGE UP
WBC # BLD: 10.07 K/UL — SIGNIFICANT CHANGE UP (ref 3.8–10.5)
WBC # FLD AUTO: 10.07 K/UL — SIGNIFICANT CHANGE UP (ref 3.8–10.5)

## 2021-12-22 PROCEDURE — 99223 1ST HOSP IP/OBS HIGH 75: CPT

## 2021-12-22 PROCEDURE — 71046 X-RAY EXAM CHEST 2 VIEWS: CPT | Mod: 26

## 2021-12-22 PROCEDURE — 70450 CT HEAD/BRAIN W/O DYE: CPT | Mod: 26

## 2021-12-22 PROCEDURE — 71250 CT THORAX DX C-: CPT | Mod: 26

## 2021-12-22 RX ORDER — ACETAMINOPHEN 500 MG
650 TABLET ORAL EVERY 6 HOURS
Refills: 0 | Status: DISCONTINUED | OUTPATIENT
Start: 2021-12-22 | End: 2021-12-24

## 2021-12-22 RX ORDER — MEMANTINE HYDROCHLORIDE 10 MG/1
1 TABLET ORAL
Qty: 0 | Refills: 0 | DISCHARGE

## 2021-12-22 RX ORDER — MEMANTINE HYDROCHLORIDE 10 MG/1
5 TABLET ORAL DAILY
Refills: 0 | Status: DISCONTINUED | OUTPATIENT
Start: 2021-12-22 | End: 2021-12-24

## 2021-12-22 RX ORDER — LISINOPRIL 2.5 MG/1
20 TABLET ORAL DAILY
Refills: 0 | Status: DISCONTINUED | OUTPATIENT
Start: 2021-12-22 | End: 2021-12-24

## 2021-12-22 RX ORDER — ASPIRIN/CALCIUM CARB/MAGNESIUM 324 MG
81 TABLET ORAL DAILY
Refills: 0 | Status: DISCONTINUED | OUTPATIENT
Start: 2021-12-22 | End: 2021-12-23

## 2021-12-22 RX ORDER — INFLUENZA VIRUS VACCINE 15; 15; 15; 15 UG/.5ML; UG/.5ML; UG/.5ML; UG/.5ML
0.7 SUSPENSION INTRAMUSCULAR ONCE
Refills: 0 | Status: DISCONTINUED | OUTPATIENT
Start: 2021-12-22 | End: 2021-12-24

## 2021-12-22 RX ORDER — ATORVASTATIN CALCIUM 80 MG/1
80 TABLET, FILM COATED ORAL AT BEDTIME
Refills: 0 | Status: DISCONTINUED | OUTPATIENT
Start: 2021-12-22 | End: 2021-12-24

## 2021-12-22 RX ORDER — HEPARIN SODIUM 5000 [USP'U]/ML
5000 INJECTION INTRAVENOUS; SUBCUTANEOUS EVERY 12 HOURS
Refills: 0 | Status: DISCONTINUED | OUTPATIENT
Start: 2021-12-22 | End: 2021-12-24

## 2021-12-22 RX ORDER — ENOXAPARIN SODIUM 100 MG/ML
40 INJECTION SUBCUTANEOUS EVERY 24 HOURS
Refills: 0 | Status: DISCONTINUED | OUTPATIENT
Start: 2021-12-22 | End: 2021-12-22

## 2021-12-22 RX ORDER — AMLODIPINE BESYLATE 2.5 MG/1
10 TABLET ORAL DAILY
Refills: 0 | Status: DISCONTINUED | OUTPATIENT
Start: 2021-12-22 | End: 2021-12-24

## 2021-12-22 RX ADMIN — ATORVASTATIN CALCIUM 80 MILLIGRAM(S): 80 TABLET, FILM COATED ORAL at 23:27

## 2021-12-22 RX ADMIN — LISINOPRIL 20 MILLIGRAM(S): 2.5 TABLET ORAL at 05:36

## 2021-12-22 RX ADMIN — MEMANTINE HYDROCHLORIDE 5 MILLIGRAM(S): 10 TABLET ORAL at 11:32

## 2021-12-22 RX ADMIN — AMLODIPINE BESYLATE 10 MILLIGRAM(S): 2.5 TABLET ORAL at 05:36

## 2021-12-22 RX ADMIN — HEPARIN SODIUM 5000 UNIT(S): 5000 INJECTION INTRAVENOUS; SUBCUTANEOUS at 18:24

## 2021-12-22 NOTE — PHYSICAL THERAPY INITIAL EVALUATION ADULT - GENERAL OBSERVATIONS, REHAB EVAL
patient received semisupine in ED stretcher in [facility]. patient denies chest pain, SOB, headache, dizziness

## 2021-12-22 NOTE — H&P ADULT - PROBLEM SELECTOR PLAN 1
Pt with 5 days of exertional dyspnea and chest pain, currently asymptomatic and on room air  -differential includes pulm sarcoid/ILD vs CAD. Trop <6 and probnp unremarkable. EKG nonischemic  -given cxr findings of increased interstitial markings and pt's past hx of neurosarcoid, will obtain high res CT to further evaluate, may need rheum vs pulm input after imaging  -clinically does not appear in heart failure. Obtain echo and watch on tele for now  -f/u covid pcr. pt afebrile with no leukocytosis

## 2021-12-22 NOTE — H&P ADULT - PROBLEM SELECTOR PLAN 2
prior cva in the past. Admitted one year ago and tolerated aspirin and statin (no longer on med list, pt self discontinued per daughter).   -pt's med allergy list at bedside includes aspirin as an allergy, however pt had been on aspirin last year during her stroke admission with no issue. Pt's daughter says no new allergic event has happened since discharge and that pt likely forgot that she can tolerate it due to her dementia (unclear what allergic reaction was to aspirin many years ago)  -will restart asa/statin

## 2021-12-22 NOTE — H&P ADULT - HISTORY OF PRESENT ILLNESS
Pt is a 72 y/o woman w/ hx of HTN, CVA, neurosarcoidosis, seizure, and dementia who presents to the ER w/ around 5 days of increasing dyspnea and chest pain with exertion. No known hx of CAD. Collateral obtained from daughter due to forgetfulness. Pt has been coughing but has no fever, abd pain or LE swelling. Of note she reportedly has an "asa" allergy but tolerated it fine last year when she had a stroke. She reportedly self discontinued it along with her statin in the past year. No new strokes nor brain hemorrhages. Her daughter is also worried about her progressing dementia that has been ongoing for years. Long term memory overall intact but is very forgetful.

## 2021-12-22 NOTE — H&P ADULT - PROBLEM SELECTOR PLAN 3
not on therapy currently. Also with progressive dementia, Aox3 but very forgetful.   -resume memantine. Workup as above  -Does not have neurologist nor PCP, will need f/u   -plan as above for r/o pulm sarcoid. will consider rheum vs neuro consult

## 2021-12-22 NOTE — H&P ADULT - ASSESSMENT
70 y/o woman w/ hx of HTN, CVA, neurosarcoidosis, seizure, and dementia who presents to the ER w/ around 5 days of increasing dyspnea and chest pain with exertion.

## 2021-12-22 NOTE — H&P ADULT - NSHPLABSRESULTS_GEN_ALL_CORE
12-21    141  |  105  |  15  ----------------------------<  89  4.8   |  22  |  0.85    Ca    9.7      21 Dec 2021 23:57    TPro  7.5  /  Alb  3.8  /  TBili  0.9  /  DBili  x   /  AST  27  /  ALT  10  /  AlkPhos  105  12-21                            14.5   10.07 )-----------( 259      ( 21 Dec 2021 23:57 )             44.6             LIVER FUNCTIONS - ( 21 Dec 2021 23:57 )  Alb: 3.8 g/dL / Pro: 7.5 g/dL / ALK PHOS: 105 U/L / ALT: 10 U/L / AST: 27 U/L / GGT: x    EKG: normal sinus rhythm    CXR: prelim read: FINDINGS:    Increased interstitial markings. No pleural effusions or pneumothorax.   Cardiac silhouette cannot be accurately evaluated in this projection. No   acute osseous pathology.    IMPRESSION: Pulmonary congestion.

## 2021-12-22 NOTE — CONSULT NOTE ADULT - ASSESSMENT
72 y/o woman w/ hx of HTN, CVA, neurosarcoidosis, seizure, and dementia who presents to the ER w/ around 5 days of increasing dyspnea and chest pain with exertion.    EKG: NSR no acute changes    1. CP/SOB  -on exertion for past few months  -trops negative. CXR clear  -obtain echo  -noted to be tachycardic on monitor, obtain ddimer. if negative will get stress test     2. HTN  -controlled  -c/w norvasc, lisinopril  -continue to monitor BP    3. DVT Prophylaxis  -hep subq

## 2021-12-22 NOTE — H&P ADULT - NSHPREVIEWOFSYSTEMS_GEN_ALL_CORE
REVIEW OF SYSTEMS:    CONSTITUTIONAL: +generalized weakness no fevers or chills  EYES/ENT: No visual changes;  No dysphagia  NECK: No pain or stiffness  RESPIRATORY: +cough, and dyspnea on exertion.  CARDIOVASCULAR: +exertional chest pain, no palpitations; No lower extremity edema  GASTROINTESTINAL: No abdominal or epigastric pain. No nausea, vomiting, or hematemesis; No diarrhea or constipation. No melena or hematochezia.  GENITOURINARY: No dysuria, frequency or hematuria  NEUROLOGICAL: No numbness or weakness  PSYCH: +anxiety  SKIN: No itching, burning, rashes, or lesions

## 2021-12-22 NOTE — CONSULT NOTE ADULT - SUBJECTIVE AND OBJECTIVE BOX
Vahe Marley MD  Interventional Cardiology / Advance Heart Failure and Cardiac Transplant Specialist  [place] Office : [phone] [place] [address] [place] [address]  Tel:   [place] Office : [address] [facility] [place] [address]  Tel: [phone]  Cell : [phone]    HISTORY OF PRESENTING ILLNESS:  72 y/o woman w/ hx of HTN, CVA, neurosarcoidosis, seizure, and dementia who presents to the ER w/ around 5 days of increasing dyspnea and chest pain with exertion. No known hx of CAD. Collateral obtained from daughter due to forgetfulness. Pt has been coughing but has no fever, abd pain or LE swelling. Of note she reportedly has an "asa" allergy but tolerated it fine last year when she had a stroke. She reportedly self discontinued it along with her statin in the past year. No new strokes nor brain hemorrhages. Her daughter is also worried about her progressing dementia that has been ongoing for years. Long term memory overall intact but is very forgetful.   	  MEDICATIONS:  amLODIPine   Tablet 10 milliGRAM(s) Oral daily  aspirin  chewable 81 milliGRAM(s) Oral daily  heparin   Injectable 5000 Unit(s) SubCutaneous every 12 hours  lisinopril 20 milliGRAM(s) Oral daily        acetaminophen     Tablet .. 650 milliGRAM(s) Oral every 6 hours PRN  memantine 5 milliGRAM(s) Oral daily      atorvastatin 80 milliGRAM(s) Oral at bedtime        PAST MEDICAL/SURGICAL HISTORY  PAST MEDICAL & SURGICAL HISTORY:  Seizure  1995    HTN (hypertension)    CVA (cerebral vascular accident)  1995 - states was in a coma for a couple of days    Neurosarcoidosis    History of tonsillectomy    Status post emergency tracheotomy for assistance in breathing  as a child due to an obstruction that was later corrected        SOCIAL HISTORY: Substance Use (street drugs): ( x ) never used  (  ) other:    FAMILY HISTORY:  No pertinent family history in first degree relatives        REVIEW OF SYSTEMS:  CONSTITUTIONAL: No fever, weight loss, or fatigue  EYES: No eye pain, visual disturbances, or discharge  ENMT:  No difficulty hearing, tinnitus, vertigo; No sinus or throat pain  BREASTS: No pain, masses, or nipple discharge  GASTROINTESTINAL: No abdominal or epigastric pain. No nausea, vomiting, or hematemesis; No diarrhea or constipation. No melena or hematochezia.  GENITOURINARY: No dysuria, frequency, hematuria, or incontinence  NEUROLOGICAL: No headaches, memory loss, loss of strength, numbness, or tremors  ENDOCRINE: No heat or cold intolerance; No hair loss  MUSCULOSKELETAL: No joint pain or swelling; No muscle, back, or extremity pain  PSYCHIATRIC: No depression, anxiety, mood swings, or difficulty sleeping  HEME/LYMPH: No easy bruising, or bleeding gums  All others negative    PHYSICAL EXAM:  T(C): 36.9 (12-22-21 @ 05:18), Max: 36.9 (12-21-21 @ 19:47)  HR: 94 (12-22-21 @ 05:18) (78 - 97)  BP: 143/85 (12-22-21 @ 05:18) (118/84 - 149/106)  RR: 18 (12-22-21 @ 05:18) (16 - 18)  SpO2: 99% (12-22-21 @ 05:18) (99% - 100%)  Wt(kg): --  I&O's Summary    Height (cm): 157.5 (12-21 @ 19:47)    GENERAL: NAD  EYES: EOMI, PERRLA, conjunctiva and sclera clear  ENMT: No tonsillar erythema, exudates, or enlargement  Cardiovascular: Normal S1 S2, No JVD, No murmurs, No edema  Respiratory: Lungs clear to auscultation	  Gastrointestinal:  Soft, Non-tender, + BS	  Extremities: No edema                                      14.5   10.07 )-----------( 259      ( 21 Dec 2021 23:57 )             44.6     12-22    142  |  108<H>  |  12  ----------------------------<  81  3.8   |  21<L>  |  0.85    Ca    9.7      22 Dec 2021 06:47  Phos  3.0     12-22  Mg     2.10     12-22    TPro  7.5  /  Alb  3.8  /  TBili  0.9  /  DBili  x   /  AST  27  /  ALT  10  /  AlkPhos  105  12-21    proBNP: Serum Pro-Brain Natriuretic Peptide: 46 pg/mL (12-21 @ 23:57)    Lipid Profile:   HgA1c:   TSH:     Consultant(s) Notes Reviewed:  [x ] YES  [ ] NO    Care Discussed with Consultants/Other Providers [ x] YES  [ ] NO    Imaging Personally Reviewed independently:  [x] YES  [ ] NO    All labs, radiologic studies, vitals, orders and medications list reviewed. Patient is seen and examined at bedside. Case discussed with medical team.        
Neurology Consult    Reason for Consult: Patient is a 71y old  Female who presents with a chief complaint of chest pain and shortness of breath (22 Dec 2021 12:34)dementia       HPI:  Pt is a 72 y/o AA woman w/  HTN, Stroke (not on AP or statin), neurosarcoidosis, seizure (not on AED), and dementia who presents to the ER w/ around 5 days of increasing dyspnea and chest pain with exertion. No known hx of CAD. Collateral obtained from daughter due to forgetfulness. Pt has been coughing but has no fever, abd pain or LE swelling. Of note she reportedly has an "asa" allergy but tolerated it fine last year when she had a stroke. She reportedly self discontinued it along with her statin in the past year. No new strokes nor brain hemorrhages. Her daughter is also worried about her progressing dementia that has been ongoing for years. Long term memory overall intact but is very forgetful.  (22 Dec 2021 04:27)       PAST MEDICAL & SURGICAL HISTORY:  Seizure  1995    HTN (hypertension)    CVA (cerebral vascular accident)  1995 - states was in a coma for a couple of days    Neurosarcoidosis    History of tonsillectomy    Status post emergency tracheotomy for assistance in breathing  as a child due to an obstruction that was later corrected        Allergies: Allergies    Dilantin (Unknown)  Vioxx (Unknown)    Intolerances        Social History: Denies toxic habits including tobacco, ETOH or illicit drugs.    Family History: FAMILY HISTORY:  No pertinent family history in first degree relatives    . No family history of strokes    Medications: MEDICATIONS  (STANDING):  amLODIPine   Tablet 10 milliGRAM(s) Oral daily  aspirin  chewable 81 milliGRAM(s) Oral daily  atorvastatin 80 milliGRAM(s) Oral at bedtime  heparin   Injectable 5000 Unit(s) SubCutaneous every 12 hours  lisinopril 20 milliGRAM(s) Oral daily  memantine 5 milliGRAM(s) Oral daily    MEDICATIONS  (PRN):  acetaminophen     Tablet .. 650 milliGRAM(s) Oral every 6 hours PRN Temp greater or equal to 38C (100.4F), Mild Pain (1 - 3)      Review of Systems:  CONSTITUTIONAL:  No weight loss, fever, chills, weakness or fatigue.  HEENT:  Eyes:  No visual loss, blurred vision, double vision or yellow sclera. Ears, Nose, Throat:  No hearing loss, sneezing, congestion, runny nose or sore throat.  SKIN:  No rash or itching.  CARDIOVASCULAR:  No chest pain, chest pressure or chest discomfort. No palpitations or edema.  RESPIRATORY:  No shortness of breath, cough or sputum.  GASTROINTESTINAL:  No anorexia, nausea, vomiting or diarrhea. No abdominal pain or blood.  GENITOURINARY:  No burning on urination or incontinence   NEUROLOGICAL:  No headache, dizziness, syncope, paralysis, ataxia, numbness or tingling in the extremities. No change in bowel or bladder control. no limb weakness. no vision changes. forgetful   MUSCULOSKELETAL:  No muscle, back pain, joint pain or stiffness.  HEMATOLOGIC:  No anemia, bleeding or bruising.  LYMPHATICS:  No enlarged nodes. No history of splenectomy.  PSYCHIATRIC:  No history of depression or anxiety.  ENDOCRINOLOGIC:  No reports of sweating, cold or heat intolerance. No polyuria or polydipsia.      Vitals:  Vital Signs Last 24 Hrs  T(C): 36.8 (22 Dec 2021 11:12), Max: 36.9 (21 Dec 2021 19:47)  T(F): 98.3 (22 Dec 2021 11:12), Max: 98.4 (21 Dec 2021 19:47)  HR: 102 (22 Dec 2021 11:12) (78 - 102)  BP: 123/77 (22 Dec 2021 11:12) (118/84 - 149/106)  BP(mean): --  RR: 14 (22 Dec 2021 11:12) (14 - 18)  SpO2: 97% (22 Dec 2021 11:12) (97% - 100%)    General Exam:   General Appearance: Appropriately dressed and in no acute distress       Head: Normocephalic, atraumatic and no dysmorphic features  Ear, Nose, and Throat: Moist mucous membranes  CVS: S1S2+  Resp: No SOB, no wheeze or rhonchi  GI: soft NT/ND  Extremities: No edema or cyanosis  Skin: No bruises or rashes     Neurological Exam:  Mental Status: Awake, alert and oriented x 2-3.  Able to follow simple  verbal commands. Able to name and repeat. fluent speech. No obvious aphasia or dysarthria noted.   Cranial Nerves: PERRL, EOMI, VFFC, sensation V1-V3 intact,  no obvious facial asymmetry, equal elevation of palate, scm/trap 5/5, tongue is midline on protrusion. no obvious papilledema on fundoscopic exam. hearing is grossly intact.   Motor:KING uppers>lowers  Sensation: Intact to light touch and pinprick throughout. no right/left confusion. no extinction to tactile on DSS.   Reflexes: 1+ throughout at biceps, brachioradialis, triceps, patellars and ankles bilaterally and equal. No clonus. R toe and L toe were both downgoing.  Coordination: No dysmetria on FNF   Gait: deferred in ED    Data/Labs/Imaging which I personally reviewed.     Labs:     CBC Full  -  ( 21 Dec 2021 23:57 )  WBC Count : 10.07 K/uL  RBC Count : 4.65 M/uL  Hemoglobin : 14.5 g/dL  Hematocrit : 44.6 %  Platelet Count - Automated : 259 K/uL  Mean Cell Volume : 95.9 fL  Mean Cell Hemoglobin : 31.2 pg  Mean Cell Hemoglobin Concentration : 32.5 gm/dL  Auto Neutrophil # : 5.45 K/uL  Auto Lymphocyte # : 3.51 K/uL  Auto Monocyte # : 0.90 K/uL  Auto Eosinophil # : 0.11 K/uL  Auto Basophil # : 0.07 K/uL  Auto Neutrophil % : 54.1 %  Auto Lymphocyte % : 34.9 %  Auto Monocyte % : 8.9 %  Auto Eosinophil % : 1.1 %  Auto Basophil % : 0.7 %    12-22    142  |  108<H>  |  12  ----------------------------<  81  3.8   |  21<L>  |  0.85    Ca    9.7      22 Dec 2021 06:47  Phos  3.0     12-22  Mg     2.10     12-22    TPro  7.5  /  Alb  3.8  /  TBili  0.9  /  DBili  x   /  AST  27  /  ALT  10  /  AlkPhos  105  12-21    LIVER FUNCTIONS - ( 21 Dec 2021 23:57 )  Alb: 3.8 g/dL / Pro: 7.5 g/dL / ALK PHOS: 105 U/L / ALT: 10 U/L / AST: 27 U/L / GGT: x           PT/INR - ( 22 Dec 2021 06:47 )   PT: 13.0 sec;   INR: 1.15 ratio         < from: MR Head w/ IV Cont (11.23.20 @ 20:01) >    EXAM:  MR SPINE LUMBAR WAW IC      EXAM:  MR BRAIN IC        PROCEDURE DATE:  Nov 23 2020         INTERPRETATION:  Clinical indication: Dizziness. Lower extremity weakness.    MRI of the brain was performed using sagittal T1, axial T1 T2 T2 FLAIR diffusion and susceptibility weighted sequence. The patient was injected with approximately 8 cc Gadavist IV with 2 cc contrast discarded. Axial T1-weighted sequences were performed.    This exam is compared prior head CT performed on November 19, 2020and brain MRI performed on November 20, 2020..    Parenchymal volume loss and chronic microvascular ischemic changes are identified.    There is evidence of abnormal T2 prolongation seen involving left frontal cortical and subcortical region. This islikely compatible area of gliosis secondary to an old infarct or old trauma. Please correlate clinically.    There is no acute hemorrhage mass or mass effect.    Evaluation of the diffusion weighted sequence demonstrates no abnormal areas of restricted diffusion to suggest acute infarct.    The large vessels demonstrate normal flow voids.    The visualized paranasal sinuses mastoid and middle ear regions appear clear.    IMPRESSION: Parenchymal volume loss and chronic microvascular ischemic changes are identified.    Scoliosis involving left frontal region.    MR of the lumbar spine was performed using sagittal T1-T2 and STIR sequence. Axial T1 and T2-weighted sequences were performed. The patient was injected with Gadavist IV and sagittal T1-weighted sequence with fat suppression was performed. Axial T1-weighted sequence was performed.    Mild loss of the normal lumbar lordosis seen.    There is a grade 1 anterolisthesis seen involving L4 on L5.    The vertebral body height alignment andmarrow signal appear normal    Disc desiccation is seen involving the lumbar spine region.    T12-L1: Normal    L1-2: Normal    L2-3: Bilateral hypertrophic facet changes are seen. No significant compromise of the spinal canal or either neural foramen.    L3-4: Disc bulge and bilateral hypertrophic facet joint changes seen. No significant compromise of the spinal canal or either    L4-5: Disc bulge and bilateral hypertrophic facet changes are seen. Mild to moderate narrowing of the spinal canal    L5-S1: Disc bulge and bilateral hypertrophic facet changes are seen. Mild to moderate narrowing of the spinal canal and mild narrowing of both neural foramen.    There is evidence of T1 shortening seen involving the lower sacrum and coccyx region. This could be compatible with radiation changes in the correct clinical setting. Please correlate clinically.    The conus ends at bottom of L1 and appears normal    Evaluation of the paraspinal soft tissues appear normal.    IMPRESSION: Degenerative changes as described above.                KIKE WARSHALL M.D., ATTENDING RADIOLOGIST  This document has been electronically signed. Nov 24 2020  7:58AM    < end of copied text >

## 2021-12-22 NOTE — H&P ADULT - NSHPPHYSICALEXAM_GEN_ALL_CORE
PHYSICAL EXAM:  GENERAL: NAD, well-developed, well-nourished  HEAD:  Atraumatic, Normocephalic  EYES: EOMI, PERRLA, conjunctiva and sclera clear  NECK: Supple, No JVD  CHEST/LUNG: Clear to auscultation bilaterally; No wheezes, rales or rhonchi  HEART: Regular rate and rhythm; No murmurs, rubs, or gallops, (+)S1, S2  ABDOMEN: Soft, Nontender, Nondistended; Normal Bowel sounds   EXTREMITIES: no peripheral edema b/l  PSYCH: normal mood and affect  NEUROLOGY: AAOx3, but at times forgetful and repeats herself. otherwise non-focal  SKIN: No rashes or lesions

## 2021-12-22 NOTE — CONSULT NOTE ADULT - ASSESSMENT
70 y/o AA woman w/  HTN, Stroke (not on AP or statin), neurosarcoidosis, seizure (not on AED), and dementia who presents to the ER w/ around 5 days of increasing dyspnea and chest pain with exertion.   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    Impression: Dementia, need to r/o reversible causes.  r/o new intracranial lesion (low suspicion)   - given h/o prior stroke should be on an antiplatelet and statin.  states she is allergic to asa. if true allergy would consider plavix as alternative otherwise asa has lower bleeding risk   - lipitor 40. LDL goal <70  - no objection to c/w memantine 5mg daily  - r/o reversible causes of dementia. check b12, RPR, TSH ordered   - CP per cardio and primary team  - check CTH pending   - PT/OT  - check FS, glucose control <180  - GI/DVT ppx  - Counseling on diet, exercise, and medication adherence was done  - Counseling on smoking cessation and alcohol consumption offered when appropriate.  - Pain assessed and judicious use of narcotics when appropriate was discussed.    - Stroke education given when appropriate.  - Importance of fall prevention discussed.   - Differential diagnosis and plan of care discussed with patient and/or family and primary team  - Thank you for allowing me to participate in the care of this patient. Call with questions.   - further outpt dementia workup on discharge   Adan Collazo MD  Vascular Neurology  Office: [phone]

## 2021-12-23 LAB
ANION GAP SERPL CALC-SCNC: 9 MMOL/L — SIGNIFICANT CHANGE UP (ref 7–14)
BUN SERPL-MCNC: 18 MG/DL — SIGNIFICANT CHANGE UP (ref 7–23)
CALCIUM SERPL-MCNC: 9.1 MG/DL — SIGNIFICANT CHANGE UP (ref 8.4–10.5)
CHLORIDE SERPL-SCNC: 108 MMOL/L — HIGH (ref 98–107)
CO2 SERPL-SCNC: 23 MMOL/L — SIGNIFICANT CHANGE UP (ref 22–31)
CREAT SERPL-MCNC: 0.91 MG/DL — SIGNIFICANT CHANGE UP (ref 0.5–1.3)
FOLATE SERPL-MCNC: 16 NG/ML — SIGNIFICANT CHANGE UP (ref 3.1–17.5)
GLUCOSE SERPL-MCNC: 81 MG/DL — SIGNIFICANT CHANGE UP (ref 70–99)
HCT VFR BLD CALC: 41.1 % — SIGNIFICANT CHANGE UP (ref 34.5–45)
HGB BLD-MCNC: 13.7 G/DL — SIGNIFICANT CHANGE UP (ref 11.5–15.5)
MAGNESIUM SERPL-MCNC: 2 MG/DL — SIGNIFICANT CHANGE UP (ref 1.6–2.6)
MCHC RBC-ENTMCNC: 30.9 PG — SIGNIFICANT CHANGE UP (ref 27–34)
MCHC RBC-ENTMCNC: 33.3 GM/DL — SIGNIFICANT CHANGE UP (ref 32–36)
MCV RBC AUTO: 92.8 FL — SIGNIFICANT CHANGE UP (ref 80–100)
NRBC # BLD: 0 /100 WBCS — SIGNIFICANT CHANGE UP
NRBC # FLD: 0 K/UL — SIGNIFICANT CHANGE UP
PHOSPHATE SERPL-MCNC: 3.3 MG/DL — SIGNIFICANT CHANGE UP (ref 2.5–4.5)
PLATELET # BLD AUTO: 255 K/UL — SIGNIFICANT CHANGE UP (ref 150–400)
POTASSIUM SERPL-MCNC: 3.8 MMOL/L — SIGNIFICANT CHANGE UP (ref 3.5–5.3)
POTASSIUM SERPL-SCNC: 3.8 MMOL/L — SIGNIFICANT CHANGE UP (ref 3.5–5.3)
RBC # BLD: 4.43 M/UL — SIGNIFICANT CHANGE UP (ref 3.8–5.2)
RBC # FLD: 13 % — SIGNIFICANT CHANGE UP (ref 10.3–14.5)
SODIUM SERPL-SCNC: 140 MMOL/L — SIGNIFICANT CHANGE UP (ref 135–145)
T PALLIDUM AB TITR SER: NEGATIVE — SIGNIFICANT CHANGE UP
TSH SERPL-MCNC: 0.75 UIU/ML — SIGNIFICANT CHANGE UP (ref 0.27–4.2)
VIT B12 SERPL-MCNC: 350 PG/ML — SIGNIFICANT CHANGE UP (ref 200–900)
WBC # BLD: 8.05 K/UL — SIGNIFICANT CHANGE UP (ref 3.8–10.5)
WBC # FLD AUTO: 8.05 K/UL — SIGNIFICANT CHANGE UP (ref 3.8–10.5)

## 2021-12-23 PROCEDURE — 93018 CV STRESS TEST I&R ONLY: CPT | Mod: GC

## 2021-12-23 PROCEDURE — 93306 TTE W/DOPPLER COMPLETE: CPT | Mod: 26

## 2021-12-23 PROCEDURE — 93016 CV STRESS TEST SUPVJ ONLY: CPT | Mod: GC

## 2021-12-23 PROCEDURE — 78452 HT MUSCLE IMAGE SPECT MULT: CPT | Mod: 26

## 2021-12-23 PROCEDURE — 93970 EXTREMITY STUDY: CPT | Mod: 26

## 2021-12-23 RX ORDER — PREGABALIN 225 MG/1
1000 CAPSULE ORAL DAILY
Refills: 0 | Status: DISCONTINUED | OUTPATIENT
Start: 2021-12-23 | End: 2021-12-24

## 2021-12-23 RX ORDER — ASPIRIN/CALCIUM CARB/MAGNESIUM 324 MG
81 TABLET ORAL DAILY
Refills: 0 | Status: DISCONTINUED | OUTPATIENT
Start: 2021-12-24 | End: 2021-12-24

## 2021-12-23 RX ADMIN — MEMANTINE HYDROCHLORIDE 5 MILLIGRAM(S): 10 TABLET ORAL at 12:51

## 2021-12-23 RX ADMIN — Medication 81 MILLIGRAM(S): at 12:51

## 2021-12-23 RX ADMIN — LISINOPRIL 20 MILLIGRAM(S): 2.5 TABLET ORAL at 06:17

## 2021-12-23 RX ADMIN — AMLODIPINE BESYLATE 10 MILLIGRAM(S): 2.5 TABLET ORAL at 06:16

## 2021-12-23 RX ADMIN — HEPARIN SODIUM 5000 UNIT(S): 5000 INJECTION INTRAVENOUS; SUBCUTANEOUS at 17:55

## 2021-12-23 RX ADMIN — ATORVASTATIN CALCIUM 80 MILLIGRAM(S): 80 TABLET, FILM COATED ORAL at 21:32

## 2021-12-23 RX ADMIN — HEPARIN SODIUM 5000 UNIT(S): 5000 INJECTION INTRAVENOUS; SUBCUTANEOUS at 06:57

## 2021-12-24 VITALS — HEART RATE: 83 BPM | SYSTOLIC BLOOD PRESSURE: 108 MMHG | DIASTOLIC BLOOD PRESSURE: 80 MMHG

## 2021-12-24 RX ORDER — ATORVASTATIN CALCIUM 80 MG/1
1 TABLET, FILM COATED ORAL
Qty: 30 | Refills: 0
Start: 2021-12-24 | End: 2022-01-22

## 2021-12-24 RX ORDER — ASPIRIN/CALCIUM CARB/MAGNESIUM 324 MG
1 TABLET ORAL
Qty: 0 | Refills: 0 | DISCHARGE
Start: 2021-12-24

## 2021-12-24 RX ORDER — METOPROLOL TARTRATE 50 MG
0.5 TABLET ORAL
Qty: 15 | Refills: 0
Start: 2021-12-24 | End: 2022-01-22

## 2021-12-24 RX ORDER — PREGABALIN 225 MG/1
1 CAPSULE ORAL
Qty: 30 | Refills: 0
Start: 2021-12-24 | End: 2022-01-22

## 2021-12-24 RX ORDER — METOPROLOL TARTRATE 50 MG
12.5 TABLET ORAL DAILY
Refills: 0 | Status: DISCONTINUED | OUTPATIENT
Start: 2021-12-24 | End: 2021-12-24

## 2021-12-24 RX ADMIN — PREGABALIN 1000 MICROGRAM(S): 225 CAPSULE ORAL at 12:41

## 2021-12-24 RX ADMIN — Medication 81 MILLIGRAM(S): at 12:41

## 2021-12-24 RX ADMIN — HEPARIN SODIUM 5000 UNIT(S): 5000 INJECTION INTRAVENOUS; SUBCUTANEOUS at 06:03

## 2021-12-24 RX ADMIN — Medication 12.5 MILLIGRAM(S): at 14:30

## 2021-12-24 RX ADMIN — AMLODIPINE BESYLATE 10 MILLIGRAM(S): 2.5 TABLET ORAL at 06:03

## 2021-12-24 RX ADMIN — LISINOPRIL 20 MILLIGRAM(S): 2.5 TABLET ORAL at 06:03

## 2021-12-24 RX ADMIN — MEMANTINE HYDROCHLORIDE 5 MILLIGRAM(S): 10 TABLET ORAL at 12:42

## 2021-12-24 NOTE — DISCHARGE NOTE PROVIDER - CARE PROVIDER_API CALL
Adan Collazo)  Neurology; Vascular Neurology  [address] [facility], [address]  [place] [address]  Phone: [phone]  Fax: [phone]  Follow Up Time: 1 week    Primary care provider,   Phone: (   )    -  Fax: (   )    -  Follow Up Time: 2 weeks    [facility],   Phone: [phone]  Fax: (   )    -  Follow Up Time: 2 weeks   Adan Collazo)  Neurology; Vascular Neurology  [address] [facility], [address]  [place] [address]  Phone: [phone]  Fax: [phone]  Follow Up Time: 1 week    Primary care provider,   Phone: (   )    -  Fax: (   )    -  Follow Up Time: 2 weeks    [facility],   Phone: [phone]  Fax: (   )    -  Follow Up Time: 2 weeks    Vahe Marley  CARDIOVASCULAR DISEASE  [address] [place] [address]  Phone: [phone]  Fax: [phone]  Follow Up Time:

## 2021-12-24 NOTE — DISCHARGE NOTE PROVIDER - PROVIDER TOKENS
PROVIDER:[TOKEN:[[id number]],FOLLOWUP:[1 week]],FREE:[LAST:[Primary care provider],PHONE:[(   )    -],FAX:[(   )    -],FOLLOWUP:[2 weeks]],FREE:[LAST:[[facility]],PHONE:[phone]],FAX:[(   )    -],FOLLOWUP:[2 weeks]] PROVIDER:[TOKEN:[[id number]],FOLLOWUP:[1 week]],FREE:[LAST:[Primary care provider],PHONE:[(   )    -],FAX:[(   )    -],FOLLOWUP:[2 weeks]],FREE:[LAST:[[facility]],PHONE:[phone]],FAX:[(   )    -],FOLLOWUP:[2 weeks]],PROVIDER:[TOKEN:[[id number]]]

## 2021-12-24 NOTE — DISCHARGE NOTE NURSING/CASE MANAGEMENT/SOCIAL WORK - PATIENT PORTAL LINK FT
You can access the FollowMyHealth Patient Portal offered by [facility] by registering at the following website: [email or web address]. By joining [employer]’s FollowMyHealth portal, you will also be able to view your health information using other applications (apps) compatible with our system.

## 2021-12-24 NOTE — PROGRESS NOTE ADULT - REASON FOR ADMISSION
chest pain and shortness of breath

## 2021-12-24 NOTE — CHART NOTE - NSCHARTNOTEFT_GEN_A_CORE
dc planning in am : attempted to call daughter no answer Mrs Lisandro/Calni  and [phone] dr. barrios made aware
Discussed case with Dr. Barnett and Helena Marley. Patient with few PVC's on telemetry, asymptomatic. Started Toprol 12.5 mg QD per cardiology recommendations. Per medical attending and cardiology, patient medically stable for discharge today. All medications reviewed with attending prior to discharge.

## 2021-12-24 NOTE — DISCHARGE NOTE PROVIDER - HOSPITAL COURSE
70 y/o woman w/ hx of HTN, CVA, neurosarcoidosis, seizure, and dementia who presents to the ER w/ around 5 days of increasing dyspnea and chest pain with exertion.    Chest pain.   - Pt with 5 days of exertional dyspnea and chest pain, currently asymptomatic and on room air  - differential includes pulm sarcoid/ILD vs CAD. Trop <6 and probnp unremarkable. EKG nonischemic  - CT chest: No acute pulmonary disease. Multiple calcified mediastinal lymph nodes, consistent with prior granulomatous disease. Right upper lobe nodule measuring 4 mm. Recommend 1 year follow-up in a  high risk patient.  - clinically does not appear in heart failure. Obtain echo and watch on tele for now  - pt afebrile with no leukocytosis.  - Sars cov negative  - Stress test normal  - Echo: Ef: 66%  CONCLUSIONS:  1. Mitral annular calcification, otherwise normal mitral  valve. Minimal mitral regurgitation.  2. Normal left ventricular internal dimensions and wall  thicknesses.  3. Normal left ventricular systolic function. No segmental  wall motion abnormalities.  4. Mild diastolic dysfunction (Stage I).  5. Normal right ventricular size and function.  - Outpatient follow up     CVA  - prior cva in the past. Admitted one year ago and tolerated aspirin and statin (no longer on med list, pt self discontinued per daughter).   - pt's med allergy list at bedside includes aspirin as an allergy, however pt had been on aspirin last year during her stroke admission with no issue. Pt's daughter says no new allergic event has happened since discharge and that pt likely forgot that she can tolerate it due to her dementia (unclear what allergic reaction was to aspirin many years ago)  - will restart asa/statin.     Neurosarcoidosis.   - not on therapy currently. Also with progressive dementia, Aox3 but very forgetful.   - resume memantine. Workup as above  - outpatient follow up with rheum and neuro    Hypertension.   - resume home amlodipine and aceI    Discussed case with Dr. Barnett on ________________________, patient medically stable for discharge to home.

## 2021-12-24 NOTE — DISCHARGE NOTE NURSING/CASE MANAGEMENT/SOCIAL WORK - NSDCPEFALRISK_GEN_ALL_CORE
For information on Fall & Injury Prevention, visit: [email or web address] OR  [email or web address] OR  https://www.cdc.gov/steadi/patient.html

## 2021-12-24 NOTE — PROGRESS NOTE ADULT - SUBJECTIVE AND OBJECTIVE BOX
Vahe Marley MD  Interventional Cardiology / Advance Heart Failure and Cardiac Transplant Specialist  [place] Office : [address] [place] [address] [place] [address]  Tel:   [place] Office : [address] [facility] [place] [address]  Tel: [phone]  Cell : [phone]    Pt is lying in bed comfortable not in distress, no chest pains no SOB no palpitations  	  MEDICATIONS:  amLODIPine   Tablet 10 milliGRAM(s) Oral daily  aspirin enteric coated 81 milliGRAM(s) Oral daily  heparin   Injectable 5000 Unit(s) SubCutaneous every 12 hours  lisinopril 20 milliGRAM(s) Oral daily        acetaminophen     Tablet .. 650 milliGRAM(s) Oral every 6 hours PRN  memantine 5 milliGRAM(s) Oral daily      atorvastatin 80 milliGRAM(s) Oral at bedtime    cyanocobalamin 1000 MICROGram(s) Oral daily  influenza  Vaccine (HIGH DOSE) 0.7 milliLiter(s) IntraMuscular once      PAST MEDICAL/SURGICAL HISTORY  PAST MEDICAL & SURGICAL HISTORY:  Seizure  1995    HTN (hypertension)    CVA (cerebral vascular accident)  1995 - states was in a coma for a couple of days    Neurosarcoidosis    History of tonsillectomy    Status post emergency tracheotomy for assistance in breathing  as a child due to an obstruction that was later corrected        SOCIAL HISTORY: Substance Use (street drugs): ( x ) never used  (  ) other:    FAMILY HISTORY:  No pertinent family history in first degree relatives        REVIEW OF SYSTEMS:  CONSTITUTIONAL: No fever, weight loss, or fatigue  EYES: No eye pain, visual disturbances, or discharge  ENMT:  No difficulty hearing, tinnitus, vertigo; No sinus or throat pain  BREASTS: No pain, masses, or nipple discharge  GASTROINTESTINAL: No abdominal or epigastric pain. No nausea, vomiting, or hematemesis; No diarrhea or constipation. No melena or hematochezia.  GENITOURINARY: No dysuria, frequency, hematuria, or incontinence  NEUROLOGICAL: No headaches, memory loss, loss of strength, numbness, or tremors  ENDOCRINE: No heat or cold intolerance; No hair loss  MUSCULOSKELETAL: No joint pain or swelling; No muscle, back, or extremity pain  PSYCHIATRIC: No depression, anxiety, mood swings, or difficulty sleeping  HEME/LYMPH: No easy bruising, or bleeding gums       PHYSICAL EXAM:  T(C): 36.6 (12-24-21 @ 12:45), Max: 36.9 (12-24-21 @ 05:50)  HR: 71 (12-24-21 @ 12:45) (71 - 81)  BP: 110/80 (12-24-21 @ 12:45) (109/78 - 110/80)  RR: 18 (12-24-21 @ 12:45) (17 - 18)  SpO2: 98% (12-24-21 @ 12:45) (97% - 98%)  Wt(kg): --  I&O's Summary        GENERAL: NAD  EYES:   PERRLA   ENMT:   Moist mucous membranes, Good dentition, No lesions  Cardiovascular: Normal S1 S2, No JVD, No murmurs, No edema  Respiratory: Lungs clear to auscultation	  Gastrointestinal:  Soft, Non-tender, + BS	  Extremities: no edema                                    13.7   8.05  )-----------( 255      ( 23 Dec 2021 07:02 )             41.1     12-23    140  |  108<H>  |  18  ----------------------------<  81  3.8   |  23  |  0.91    Ca    9.1      23 Dec 2021 07:02  Phos  3.3     12-23  Mg     2.00     12-23      proBNP:   Lipid Profile:   HgA1c:   TSH:     Consultant(s) Notes Reviewed:  [x ] YES  [ ] NO    Care Discussed with Consultants/Other Providers [ x] YES  [ ] NO    Imaging Personally Reviewed independently:  [x] YES  [ ] NO    All labs, radiologic studies, vitals, orders and medications list reviewed. Patient is seen and examined at bedside. Case discussed with medical team.        
Date of service: 12-23-21 @ 22:52      Patient is a 71y old  Female who presents with a chief complaint of chest pain and shortness of breath (23 Dec 2021 18:42)                                                               INTERVAL HPI/OVERNIGHT EVENTS:    REVIEW OF SYSTEMS:     CONSTITUTIONAL: No weakness, fevers or chills  RESPIRATORY: No cough, wheezing,  No shortness of breath  CARDIOVASCULAR: No chest pain or palpitations  GASTROINTESTINAL: No abdominal pain  . No nausea, vomiting, or hematemesis; No diarrhea or constipation. No melena or hematochezia.  GENITOURINARY: No dysuria, frequency or hematuria  NEUROLOGICAL: No numbness or weakness                                                                                                                                                                                                                                                                                Medications:  MEDICATIONS  (STANDING):  amLODIPine   Tablet 10 milliGRAM(s) Oral daily  aspirin enteric coated 81 milliGRAM(s) Oral daily  atorvastatin 80 milliGRAM(s) Oral at bedtime  cyanocobalamin 1000 MICROGram(s) Oral daily  heparin   Injectable 5000 Unit(s) SubCutaneous every 12 hours  influenza  Vaccine (HIGH DOSE) 0.7 milliLiter(s) IntraMuscular once  lisinopril 20 milliGRAM(s) Oral daily  memantine 5 milliGRAM(s) Oral daily    MEDICATIONS  (PRN):  acetaminophen     Tablet .. 650 milliGRAM(s) Oral every 6 hours PRN Temp greater or equal to 38C (100.4F), Mild Pain (1 - 3)       Allergies    Dilantin (Unknown)  Vioxx (Unknown)    Intolerances      Vital Signs Last 24 Hrs  T(C): 36.6 (23 Dec 2021 21:17), Max: 36.8 (23 Dec 2021 05:41)  T(F): 97.9 (23 Dec 2021 21:17), Max: 98.2 (23 Dec 2021 05:41)  HR: 81 (23 Dec 2021 21:17) (78 - 86)  BP: 110/72 (23 Dec 2021 21:17) (110/72 - 124/95)  BP(mean): --  RR: 18 (23 Dec 2021 21:17) (17 - 18)  SpO2: 98% (23 Dec 2021 21:17) (98% - 98%)  CAPILLARY BLOOD GLUCOSE          Physical Exam:    Daily     Daily   General:  Well appearing, NAD, not cachetic  HEENT:  Nonicteric, PERRLA  CV:  RRR, S1S2   Lungs:  CTA B/L, no wheezes, rales, rhonchi  Abdomen:  Soft, non-tender, no distended, positive BS  Extremities: no edema  Neuro:  AAOx3, non-focal, grossly intact                                                                                                                                                                                                                                                                                                LABS:                               13.7   8.05  )-----------( 255      ( 23 Dec 2021 07:02 )             41.1                      12-23    140  |  108<H>  |  18  ----------------------------<  81  3.8   |  23  |  0.91    Ca    9.1      23 Dec 2021 07:02  Phos  3.3     12-23  Mg     2.00     12-23    TPro  7.5  /  Alb  3.8  /  TBili  0.9  /  DBili  x   /  AST  27  /  ALT  10  /  AlkPhos  105  12-21                       RADIOLOGY & ADDITIONAL TESTS         I personally reviewed: [  ]EKG   [  ]CXR    [  ] CT      A/P:         Discussed with :     Stacey consultants' Notes   Time spent :  
Neurology Progress Note    S: Patient seen and examined. No new events overnight. patient denied CP, SOB, HA or pain. seems better dc planning     Medication:  MEDICATIONS  (STANDING):  amLODIPine   Tablet 10 milliGRAM(s) Oral daily  aspirin enteric coated 81 milliGRAM(s) Oral daily  atorvastatin 80 milliGRAM(s) Oral at bedtime  cyanocobalamin 1000 MICROGram(s) Oral daily  heparin   Injectable 5000 Unit(s) SubCutaneous every 12 hours  influenza  Vaccine (HIGH DOSE) 0.7 milliLiter(s) IntraMuscular once  lisinopril 20 milliGRAM(s) Oral daily  memantine 5 milliGRAM(s) Oral daily  metoprolol succinate ER 12.5 milliGRAM(s) Oral daily    MEDICATIONS  (PRN):  acetaminophen     Tablet .. 650 milliGRAM(s) Oral every 6 hours PRN Temp greater or equal to 38C (100.4F), Mild Pain (1 - 3)      Vitals:  Vital Signs Last 24 Hrs  T(C): 36.6 (12-24-21 @ 12:45), Max: 36.9 (12-24-21 @ 05:50)  T(F): 97.8 (12-24-21 @ 12:45), Max: 98.4 (12-24-21 @ 05:50)  HR: 71 (12-24-21 @ 12:45) (71 - 81)  BP: 110/80 (12-24-21 @ 12:45) (109/78 - 110/80)  BP(mean): --  RR: 18 (12-24-21 @ 12:45) (17 - 18)  SpO2: 98% (12-24-21 @ 12:45) (97% - 98%)      General Exam:   General Appearance: Appropriately dressed and in no acute distress       Head: Normocephalic, atraumatic and no dysmorphic features  Ear, Nose, and Throat: Moist mucous membranes  CVS: S1S2+  Resp: No SOB, no wheeze or rhonchi  GI: soft NT/ND  Extremities: No edema or cyanosis  Skin: No bruises or rashes     Neurological Exam:  Mental Status: Awake, alert and oriented x 2-3.  Able to follow simple  verbal commands. Able to name and repeat. fluent speech. No obvious aphasia or dysarthria noted.   Cranial Nerves: PERRL, EOMI, VFFC, sensation V1-V3 intact,  no obvious facial asymmetry, equal elevation of palate, scm/trap 5/5, tongue is midline on protrusion. no obvious papilledema on fundoscopic exam. hearing is grossly intact.   Motor:KING uppers>lowers  Sensation: Intact to light touch and pinprick throughout. no right/left confusion. no extinction to tactile on DSS.   Reflexes: 1+ throughout at biceps, brachioradialis, triceps, patellars and ankles bilaterally and equal. No clonus. R toe and L toe were both downgoing.  Coordination: No dysmetria on FNF   Gait: deferred     I personally reviewed the below data/images/labs:  no new labs   CBC Full  -  ( 23 Dec 2021 07:02 )  WBC Count : 8.05 K/uL  RBC Count : 4.43 M/uL  Hemoglobin : 13.7 g/dL  Hematocrit : 41.1 %  Platelet Count - Automated : 255 K/uL  Mean Cell Volume : 92.8 fL  Mean Cell Hemoglobin : 30.9 pg  Mean Cell Hemoglobin Concentration : 33.3 gm/dL  Auto Neutrophil # : x  Auto Lymphocyte # : x  Auto Monocyte # : x  Auto Eosinophil # : x  Auto Basophil # : x  Auto Neutrophil % : x  Auto Lymphocyte % : x  Auto Monocyte % : x  Auto Eosinophil % : x  Auto Basophil % : x      CBC Full  -  ( 23 Dec 2021 07:02 )  WBC Count : 8.05 K/uL  RBC Count : 4.43 M/uL  Hemoglobin : 13.7 g/dL  Hematocrit : 41.1 %  Platelet Count - Automated : 255 K/uL  Mean Cell Volume : 92.8 fL  Mean Cell Hemoglobin : 30.9 pg  Mean Cell Hemoglobin Concentration : 33.3 gm/dL  Auto Neutrophil # : x  Auto Lymphocyte # : x  Auto Monocyte # : x  Auto Eosinophil # : x  Auto Basophil # : x  Auto Neutrophil % : x  Auto Lymphocyte % : x  Auto Monocyte % : x  Auto Eosinophil % : x  Auto Basophil % : x    12-23    140  |  108<H>  |  18  ----------------------------<  81  3.8   |  23  |  0.91    Ca    9.1      23 Dec 2021 07:02  Phos  3.3     12-23  Mg     2.00     12-23    TPro  7.5  /  Alb  3.8  /  TBili  0.9  /  DBili  x   /  AST  27  /  ALT  10  /  AlkPhos  105  12-21    LIVER FUNCTIONS - ( 21 Dec 2021 23:57 )  Alb: 3.8 g/dL / Pro: 7.5 g/dL / ALK PHOS: 105 U/L / ALT: 10 U/L / AST: 27 U/L / GGT: x           PT/INR - ( 22 Dec 2021 06:47 )   PT: 13.0 sec;   INR: 1.15 ratio              < from: MR Head w/ IV Cont (11.23.20 @ 20:01) >    EXAM:  MR SPINE LUMBAR WAW IC      EXAM:  MR BRAIN IC        PROCEDURE DATE:  Nov 23 2020         INTERPRETATION:  Clinical indication: Dizziness. Lower extremity weakness.    MRI of the brain was performed using sagittal T1, axial T1 T2 T2 FLAIR diffusion and susceptibility weighted sequence. The patient was injected with approximately 8 cc Gadavist IV with 2 cc contrast discarded. Axial T1-weighted sequences were performed.    This exam is compared prior head CT performed on November 19, 2020and brain MRI performed on November 20, 2020..    Parenchymal volume loss and chronic microvascular ischemic changes are identified.    There is evidence of abnormal T2 prolongation seen involving left frontal cortical and subcortical region. This islikely compatible area of gliosis secondary to an old infarct or old trauma. Please correlate clinically.    There is no acute hemorrhage mass or mass effect.    Evaluation of the diffusion weighted sequence demonstrates no abnormal areas of restricted diffusion to suggest acute infarct.    The large vessels demonstrate normal flow voids.    The visualized paranasal sinuses mastoid and middle ear regions appear clear.    IMPRESSION: Parenchymal volume loss and chronic microvascular ischemic changes are identified.    Scoliosis involving left frontal region.    MR of the lumbar spine was performed using sagittal T1-T2 and STIR sequence. Axial T1 and T2-weighted sequences were performed. The patient was injected with Gadavist IV and sagittal T1-weighted sequence with fat suppression was performed. Axial T1-weighted sequence was performed.    Mild loss of the normal lumbar lordosis seen.    There is a grade 1 anterolisthesis seen involving L4 on L5.    The vertebral body height alignment andmarrow signal appear normal    Disc desiccation is seen involving the lumbar spine region.    T12-L1: Normal    L1-2: Normal    L2-3: Bilateral hypertrophic facet changes are seen. No significant compromise of the spinal canal or either neural foramen.    L3-4: Disc bulge and bilateral hypertrophic facet joint changes seen. No significant compromise of the spinal canal or either    L4-5: Disc bulge and bilateral hypertrophic facet changes are seen. Mild to moderate narrowing of the spinal canal    L5-S1: Disc bulge and bilateral hypertrophic facet changes are seen. Mild to moderate narrowing of the spinal canal and mild narrowing of both neural foramen.    There is evidence of T1 shortening seen involving the lower sacrum and coccyx region. This could be compatible with radiation changes in the correct clinical setting. Please correlate clinically.    The conus ends at bottom of L1 and appears normal    Evaluation of the paraspinal soft tissues appear normal.    IMPRESSION: Degenerative changes as described above.      KIKE HOROWITZ M.D., ATTENDING RADIOLOGIST  This document has been electronically signed. Nov 24 2020  7:58AM    < end of copied text >        < from: CT Head No Cont (12.22.21 @ 17:48) >    ACC: [id number] EXAM:  CT BRAIN                          PROCEDURE DATE:  12/22/2021          INTERPRETATION:  INDICATIONS:  R sided weakness  .    TECHNIQUE:  Serial axial images were obtained from the skull base to the   vertex without intravenous contrast. Coronal and sagittal reformatted   images were obtained.    COMPARISON EXAMINATION: 11/19/2020    FINDINGS:  VENTRICLES AND SULCI:  Normal.  INTRA-AXIAL:  Patchy areas of white matter hypodensity are seen   compatible with moderate microvascular-type changes. An area of   encephalomalacia is again seen in the anterior left frontal lobe   inferiorly compatible with the sequelae of prior traumatic contusion. No   mass effect or hemorrhage is seen. Gray-white matter differentiation is   otherwise preserved.  EXTRA-AXIAL:  No mass or collection is seen.  VISUALIZED SINUSES:  Clear.  VISUALIZED MASTOIDS:  Clear.  CALVARIUM:  Status post left frontal craniotomy  MISCELLANEOUS:  None.    IMPRESSION:  Stable exam.    No mass effect, hemorrhage or evidence of acute intracranial pathology.    --- End of Report ---            LILLIAN GUERRERO MD; Attending Radiologist  This document has been electronically signed. Dec 22 2021  6:07PM    < end of copied text >      
Vahe Marley MD  Interventional Cardiology / Advance Heart Failure and Cardiac Transplant Specialist  [place] Office : [address] [place] [address] [place] [address]  Tel:   [place] Office : [address] [facility] [place] [address]  Tel: [phone]  Cell : [phone]    Pt is lying in bed comfortable not in distress, no chest pains no SOB no palpitations  	  MEDICATIONS:  amLODIPine   Tablet 10 milliGRAM(s) Oral daily  aspirin enteric coated 81 milliGRAM(s) Oral daily  heparin   Injectable 5000 Unit(s) SubCutaneous every 12 hours  lisinopril 20 milliGRAM(s) Oral daily        acetaminophen     Tablet .. 650 milliGRAM(s) Oral every 6 hours PRN  memantine 5 milliGRAM(s) Oral daily      atorvastatin 80 milliGRAM(s) Oral at bedtime    cyanocobalamin 1000 MICROGram(s) Oral daily  influenza  Vaccine (HIGH DOSE) 0.7 milliLiter(s) IntraMuscular once      PAST MEDICAL/SURGICAL HISTORY  PAST MEDICAL & SURGICAL HISTORY:  Seizure  1995    HTN (hypertension)    CVA (cerebral vascular accident)  1995 - states was in a coma for a couple of days    Neurosarcoidosis    History of tonsillectomy    Status post emergency tracheotomy for assistance in breathing  as a child due to an obstruction that was later corrected        SOCIAL HISTORY: Substance Use (street drugs): ( x ) never used  (  ) other:    FAMILY HISTORY:  No pertinent family history in first degree relatives            PHYSICAL EXAM:  T(C): 36.9 (12-24-21 @ 05:50), Max: 36.9 (12-24-21 @ 05:50)  HR: 78 (12-24-21 @ 05:50) (78 - 86)  BP: 109/78 (12-24-21 @ 05:50) (109/78 - 124/95)  RR: 17 (12-24-21 @ 05:50) (17 - 18)  SpO2: 97% (12-24-21 @ 05:50) (97% - 98%)  Wt(kg): --  I&O's Summary        GENERAL: NAD  EYES:   PERRLA   ENMT:   Moist mucous membranes, Good dentition, No lesions  Cardiovascular: Normal S1 S2, No JVD, No murmurs, No edema  Respiratory: Lungs clear to auscultation	  Gastrointestinal:  Soft, Non-tender, + BS	  Extremities: no edema                                    13.7   8.05  )-----------( 255      ( 23 Dec 2021 07:02 )             41.1     12-23    140  |  108<H>  |  18  ----------------------------<  81  3.8   |  23  |  0.91    Ca    9.1      23 Dec 2021 07:02  Phos  3.3     12-23  Mg     2.00     12-23      proBNP:   Lipid Profile:   HgA1c:   TSH:     Consultant(s) Notes Reviewed:  [x ] YES  [ ] NO    Care Discussed with Consultants/Other Providers [ x] YES  [ ] NO    Imaging Personally Reviewed independently:  [x] YES  [ ] NO    All labs, radiologic studies, vitals, orders and medications list reviewed. Patient is seen and examined at bedside. Case discussed with medical team.        
Date of service: 12-24-21 @ 10:23      Patient is a 71y old  Female who presents with a chief complaint of chest pain and shortness of breath (23 Dec 2021 22:52)                                                               INTERVAL HPI/OVERNIGHT EVENTS:    REVIEW OF SYSTEMS:     CONSTITUTIONAL: No weakness, fevers or chills  RESPIRATORY: No cough, wheezing,  No shortness of breath  CARDIOVASCULAR: No chest pain or palpitations  GASTROINTESTINAL: No abdominal pain  . No nausea, vomiting, or hematemesis; No diarrhea or constipation. No melena or hematochezia.  GENITOURINARY: No dysuria, frequency or hematuria  NEUROLOGICAL: No numbness or weakness                                                                                                                                                                                                                                                                                Medications:  MEDICATIONS  (STANDING):  amLODIPine   Tablet 10 milliGRAM(s) Oral daily  aspirin enteric coated 81 milliGRAM(s) Oral daily  atorvastatin 80 milliGRAM(s) Oral at bedtime  cyanocobalamin 1000 MICROGram(s) Oral daily  heparin   Injectable 5000 Unit(s) SubCutaneous every 12 hours  influenza  Vaccine (HIGH DOSE) 0.7 milliLiter(s) IntraMuscular once  lisinopril 20 milliGRAM(s) Oral daily  memantine 5 milliGRAM(s) Oral daily    MEDICATIONS  (PRN):  acetaminophen     Tablet .. 650 milliGRAM(s) Oral every 6 hours PRN Temp greater or equal to 38C (100.4F), Mild Pain (1 - 3)       Allergies    Dilantin (Unknown)  Vioxx (Unknown)    Intolerances      Vital Signs Last 24 Hrs  T(C): 36.9 (24 Dec 2021 05:50), Max: 36.9 (24 Dec 2021 05:50)  T(F): 98.4 (24 Dec 2021 05:50), Max: 98.4 (24 Dec 2021 05:50)  HR: 78 (24 Dec 2021 05:50) (78 - 86)  BP: 109/78 (24 Dec 2021 05:50) (109/78 - 124/95)  BP(mean): --  RR: 17 (24 Dec 2021 05:50) (17 - 18)  SpO2: 97% (24 Dec 2021 05:50) (97% - 98%)  CAPILLARY BLOOD GLUCOSE          Physical Exam:    Daily     Daily   General:  Well appearing, NAD, not cachetic  HEENT:  Nonicteric, PERRLA  CV:  RRR, S1S2   Lungs:  CTA B/L, no wheezes, rales, rhonchi  Abdomen:  Soft, non-tender, no distended, positive BS  Extremities:  2+ pulses, no c/c, no edema  Skin:  Warm and dry, no rashes  :  No khan  Neuro:  NT                                                                                                                                                                                                                                                                                               LABS:                               13.7   8.05  )-----------( 255      ( 23 Dec 2021 07:02 )             41.1                      12-23    140  |  108<H>  |  18  ----------------------------<  81  3.8   |  23  |  0.91    Ca    9.1      23 Dec 2021 07:02  Phos  3.3     12-23  Mg     2.00     12-23                         RADIOLOGY & ADDITIONAL TESTS         I personally reviewed: [  ]EKG   [  ]CXR    [  ] CT      A/P:         Discussed with :     Stacey consultants' Notes   Time spent :  
Neurology Progress Note    S: Patient seen and examined. No new events overnight. patient denied CP, SOB, HA or pain. seems better     Medication:  acetaminophen     Tablet .. 650 milliGRAM(s) Oral every 6 hours PRN  amLODIPine   Tablet 10 milliGRAM(s) Oral daily  aspirin enteric coated 81 milliGRAM(s) Oral daily  atorvastatin 80 milliGRAM(s) Oral at bedtime  heparin   Injectable 5000 Unit(s) SubCutaneous every 12 hours  influenza  Vaccine (HIGH DOSE) 0.7 milliLiter(s) IntraMuscular once  lisinopril 20 milliGRAM(s) Oral daily  memantine 5 milliGRAM(s) Oral daily      Vitals:  Vital Signs Last 24 Hrs  T(C): 36.7 (23 Dec 2021 12:53), Max: 36.9 (22 Dec 2021 18:45)  T(F): 98.1 (23 Dec 2021 12:53), Max: 98.4 (22 Dec 2021 18:45)  HR: 86 (23 Dec 2021 12:53) (78 - 89)  BP: 124/95 (23 Dec 2021 12:53) (114/80 - 130/86)  BP(mean): --  RR: 18 (23 Dec 2021 12:53) (17 - 18)  SpO2: 98% (23 Dec 2021 12:53) (96% - 99%)    General Exam:   General Appearance: Appropriately dressed and in no acute distress       Head: Normocephalic, atraumatic and no dysmorphic features  Ear, Nose, and Throat: Moist mucous membranes  CVS: S1S2+  Resp: No SOB, no wheeze or rhonchi  GI: soft NT/ND  Extremities: No edema or cyanosis  Skin: No bruises or rashes     Neurological Exam:  Mental Status: Awake, alert and oriented x 2-3.  Able to follow simple  verbal commands. Able to name and repeat. fluent speech. No obvious aphasia or dysarthria noted.   Cranial Nerves: PERRL, EOMI, VFFC, sensation V1-V3 intact,  no obvious facial asymmetry, equal elevation of palate, scm/trap 5/5, tongue is midline on protrusion. no obvious papilledema on fundoscopic exam. hearing is grossly intact.   Motor:KING uppers>lowers  Sensation: Intact to light touch and pinprick throughout. no right/left confusion. no extinction to tactile on DSS.   Reflexes: 1+ throughout at biceps, brachioradialis, triceps, patellars and ankles bilaterally and equal. No clonus. R toe and L toe were both downgoing.  Coordination: No dysmetria on FNF   Gait: deferred     I personally reviewed the below data/images/labs:      CBC Full  -  ( 23 Dec 2021 07:02 )  WBC Count : 8.05 K/uL  RBC Count : 4.43 M/uL  Hemoglobin : 13.7 g/dL  Hematocrit : 41.1 %  Platelet Count - Automated : 255 K/uL  Mean Cell Volume : 92.8 fL  Mean Cell Hemoglobin : 30.9 pg  Mean Cell Hemoglobin Concentration : 33.3 gm/dL  Auto Neutrophil # : x  Auto Lymphocyte # : x  Auto Monocyte # : x  Auto Eosinophil # : x  Auto Basophil # : x  Auto Neutrophil % : x  Auto Lymphocyte % : x  Auto Monocyte % : x  Auto Eosinophil % : x  Auto Basophil % : x    12-23    140  |  108<H>  |  18  ----------------------------<  81  3.8   |  23  |  0.91    Ca    9.1      23 Dec 2021 07:02  Phos  3.3     12-23  Mg     2.00     12-23    TPro  7.5  /  Alb  3.8  /  TBili  0.9  /  DBili  x   /  AST  27  /  ALT  10  /  AlkPhos  105  12-21    LIVER FUNCTIONS - ( 21 Dec 2021 23:57 )  Alb: 3.8 g/dL / Pro: 7.5 g/dL / ALK PHOS: 105 U/L / ALT: 10 U/L / AST: 27 U/L / GGT: x           PT/INR - ( 22 Dec 2021 06:47 )   PT: 13.0 sec;   INR: 1.15 ratio              < from: MR Head w/ IV Cont (11.23.20 @ 20:01) >    EXAM:  MR SPINE LUMBAR WAW IC      EXAM:  MR BRAIN IC        PROCEDURE DATE:  Nov 23 2020         INTERPRETATION:  Clinical indication: Dizziness. Lower extremity weakness.    MRI of the brain was performed using sagittal T1, axial T1 T2 T2 FLAIR diffusion and susceptibility weighted sequence. The patient was injected with approximately 8 cc Gadavist IV with 2 cc contrast discarded. Axial T1-weighted sequences were performed.    This exam is compared prior head CT performed on November 19, 2020and brain MRI performed on November 20, 2020..    Parenchymal volume loss and chronic microvascular ischemic changes are identified.    There is evidence of abnormal T2 prolongation seen involving left frontal cortical and subcortical region. This islikely compatible area of gliosis secondary to an old infarct or old trauma. Please correlate clinically.    There is no acute hemorrhage mass or mass effect.    Evaluation of the diffusion weighted sequence demonstrates no abnormal areas of restricted diffusion to suggest acute infarct.    The large vessels demonstrate normal flow voids.    The visualized paranasal sinuses mastoid and middle ear regions appear clear.    IMPRESSION: Parenchymal volume loss and chronic microvascular ischemic changes are identified.    Scoliosis involving left frontal region.    MR of the lumbar spine was performed using sagittal T1-T2 and STIR sequence. Axial T1 and T2-weighted sequences were performed. The patient was injected with Gadavist IV and sagittal T1-weighted sequence with fat suppression was performed. Axial T1-weighted sequence was performed.    Mild loss of the normal lumbar lordosis seen.    There is a grade 1 anterolisthesis seen involving L4 on L5.    The vertebral body height alignment andmarrow signal appear normal    Disc desiccation is seen involving the lumbar spine region.    T12-L1: Normal    L1-2: Normal    L2-3: Bilateral hypertrophic facet changes are seen. No significant compromise of the spinal canal or either neural foramen.    L3-4: Disc bulge and bilateral hypertrophic facet joint changes seen. No significant compromise of the spinal canal or either    L4-5: Disc bulge and bilateral hypertrophic facet changes are seen. Mild to moderate narrowing of the spinal canal    L5-S1: Disc bulge and bilateral hypertrophic facet changes are seen. Mild to moderate narrowing of the spinal canal and mild narrowing of both neural foramen.    There is evidence of T1 shortening seen involving the lower sacrum and coccyx region. This could be compatible with radiation changes in the correct clinical setting. Please correlate clinically.    The conus ends at bottom of L1 and appears normal    Evaluation of the paraspinal soft tissues appear normal.    IMPRESSION: Degenerative changes as described above.      KIKE HOROWITZ M.D., ATTENDING RADIOLOGIST  This document has been electronically signed. Nov 24 2020  7:58AM    < end of copied text >        < from: CT Head No Cont (12.22.21 @ 17:48) >    ACC: [id number] EXAM:  CT BRAIN                          PROCEDURE DATE:  12/22/2021          INTERPRETATION:  INDICATIONS:  R sided weakness  .    TECHNIQUE:  Serial axial images were obtained from the skull base to the   vertex without intravenous contrast. Coronal and sagittal reformatted   images were obtained.    COMPARISON EXAMINATION: 11/19/2020    FINDINGS:  VENTRICLES AND SULCI:  Normal.  INTRA-AXIAL:  Patchy areas of white matter hypodensity are seen   compatible with moderate microvascular-type changes. An area of   encephalomalacia is again seen in the anterior left frontal lobe   inferiorly compatible with the sequelae of prior traumatic contusion. No   mass effect or hemorrhage is seen. Gray-white matter differentiation is   otherwise preserved.  EXTRA-AXIAL:  No mass or collection is seen.  VISUALIZED SINUSES:  Clear.  VISUALIZED MASTOIDS:  Clear.  CALVARIUM:  Status post left frontal craniotomy  MISCELLANEOUS:  None.    IMPRESSION:  Stable exam.    No mass effect, hemorrhage or evidence of acute intracranial pathology.    --- End of Report ---            LILLIAN GUERRERO MD; Attending Radiologist  This document has been electronically signed. Dec 22 2021  6:07PM    < end of copied text >

## 2021-12-24 NOTE — DISCHARGE NOTE PROVIDER - NSDCMRMEDTOKEN_GEN_ALL_CORE_FT
acetaminophen 325 mg oral tablet: 2 tab(s) orally every 6 hours, As needed, Temp greater or equal to 38C (100.4F), Mild Pain (1 - 3), Moderate Pain (4 - 6)  amlodipine-benazepril 10 mg-20 mg oral capsule: 1 cap(s) orally once a day  memantine 7 mg oral capsule, extended release: 1 cap(s) orally once a day  Multiple Vitamins oral tablet: 1 tab(s) orally once a day  Physical Therapy : Apply topically to affected area once a day  Outpatient physical therapy secondary to weakness, per inpatient PT recommendations    acetaminophen 325 mg oral tablet: 2 tab(s) orally every 6 hours, As needed, Temp greater or equal to 38C (100.4F), Mild Pain (1 - 3), Moderate Pain (4 - 6)  amlodipine-benazepril 10 mg-20 mg oral capsule: 1 cap(s) orally once a day  aspirin 81 mg oral delayed release tablet: 1 tab(s) orally once a day  memantine 7 mg oral capsule, extended release: 1 cap(s) orally once a day   acetaminophen 325 mg oral tablet: 2 tab(s) orally every 6 hours, As needed, Temp greater or equal to 38C (100.4F), Mild Pain (1 - 3), Moderate Pain (4 - 6)  amlodipine-benazepril 10 mg-20 mg oral capsule: 1 cap(s) orally once a day  aspirin 81 mg oral delayed release tablet: 1 tab(s) orally once a day  atorvastatin 80 mg oral tablet: 1 tab(s) orally once a day (at bedtime)  cyanocobalamin 1000 mcg oral tablet: 1 tab(s) orally once a day  memantine 7 mg oral capsule, extended release: 1 cap(s) orally once a day  Multiple Vitamins oral tablet: 1 tab(s) orally once a day  Physical Therapy : Apply topically to affected area once a day  Outpatient physical therapy secondary to weakness, per inpatient PT recommendations

## 2021-12-24 NOTE — DISCHARGE NOTE NURSING/CASE MANAGEMENT/SOCIAL WORK - NSDCFUADDAPPT_GEN_ALL_CORE_FT
Please follow up with your primary care provider within 1 week of discharge from the hospital. If you do not have a primary care provider please follow up with the [facility], call [phone].

## 2021-12-24 NOTE — PROGRESS NOTE ADULT - ASSESSMENT
70 y/o woman w/ hx of HTN, CVA, neurosarcoidosis, seizure, and dementia who presents to the ER w/ around 5 days of increasing dyspnea and chest pain with exertion.    Problem/Plan - 1:  ·  Problem: Chest pain.   ·  Plan: Pt with 5 days of exertional dyspnea and chest pain, currently asymptomatic and on room air  -differential includes pulm sarcoid/ILD vs CAD. Trop <6 and probnp unremarkable. EKG nonischemic  -given cxr findings of increased interstitial markings and pt's past hx of neurosarcoid, will obtain high res CT to further evaluate, may need rheum vs pulm input after imaging  -clinically does not appear in heart failure. Obtain echo and watch on tele for now  -f/u covid pcr. pt afebrile with no leukocytosis.    Problem/Plan - 2:  ·  Problem: CVA (cerebrovascular accident).   ·  Plan: prior cva in the past. Admitted one year ago and tolerated aspirin and statin (no longer on med list, pt self discontinued per daughter).   -pt's med allergy list at bedside includes aspirin as an allergy, however pt had been on aspirin last year during her stroke admission with no issue. Pt's daughter says no new allergic event has happened since discharge and that pt likely forgot that she can tolerate it due to her dementia (unclear what allergic reaction was to aspirin many years ago)  restart asa/statin.: : tolerating meds       Problem/Plan - 3:  ·  Problem: Neurosarcoidosis.   ·  Plan: with progressive dementia, Aox3 but very forgetful.   b12 low nl  : supplement .. tsh and folate acceptable levels   -resume memantine. Workup as above  -Does not have neurologist nor PCP, will need f/u   - CT reviewed with neuro : no further perez ..no indication for steroids   - CT chest : No acute pulmonary disease.  Multiple calcified mediastinal lymph nodes, consistent with prior   granulomatous disease.  Right upper lobe nodule measuring 4 mm. Recommend 1 year follow-up in a   high risk patient.  fu as op :  consider pul prior to dc       Problem/Plan - 4:  ·  Problem: Hypertension.   ·  Plan: resume home amlodipine and aceI.    Problem/Plan - 5:  ·  Problem: Need for prophylactic measure.   ·  Plan: lovenox subcu for dvt ppx, dash diet.    called daughter and discussed  all above   ACP :were discussed .. duaghter is HCP however had nt discussed DNR/DNI etc .. she will address as op
70 y/o woman w/ hx of HTN, CVA, neurosarcoidosis, seizure, and dementia who presents to the ER w/ around 5 days of increasing dyspnea and chest pain with exertion.    EKG: NSR no acute changes    1. CP/SOB  -on exertion for past few months  -trops negative. CXR clear  -obtain echo  -noted to be tachycardic on monitor,  ddimer negative f/u stress test     2. HTN  -controlled  -c/w norvasc, lisinopril  -continue to monitor BP    3. DVT Prophylaxis  -hep subq
72 y/o AA woman w/  HTN, Stroke (not on AP or statin), neurosarcoidosis, seizure (not on AED), and dementia who presents to the ER w/ around 5 days of increasing dyspnea and chest pain with exertion.   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    b12 low 350 TSH WNL RPR neg  CTH stable unremarkble old L frontal injury   Impression: Dementia, need to r/o reversible causes.  r/o new intracranial lesion (low suspicion)   - given h/o prior stroke should be on an antiplatelet and statin.  states she is allergic to asa. if true allergy would consider plavix as alternative otherwise asa has lower bleeding risk.  getting asa here with no problem   - lipitor 40. LDL goal <70  - no objection to c/w memantine 5mg daily  - r/o reversible causes of dementia. check b12, RPR, TSH --> B12 low normal 350, would supplement   - CP per cardio and primary team  - PT/OT  - check FS, glucose control <180  - GI/DVT ppx  - Counseling on diet, exercise, and medication adherence was done  - Counseling on smoking cessation and alcohol consumption offered when appropriate.  - Pain assessed and judicious use of narcotics when appropriate was discussed.    - Stroke education given when appropriate.  - Importance of fall prevention discussed.   - Differential diagnosis and plan of care discussed with patient and/or family and primary team  - Thank you for allowing me to participate in the care of this patient. Call with questions.   - further outpt dementia workup on discharge   Adan Collazo MD  Vascular Neurology  Office: [phone]     
70 y/o woman w/ hx of HTN, CVA, neurosarcoidosis, seizure, and dementia who presents to the ER w/ around 5 days of increasing dyspnea and chest pain with exertion.    EKG: NSR no acute changes    1. CP/SOB  -on exertion for past few months  -trops negative. CXR clear  -obtain echo  -noted to be tachycardic on monitor,  ddimer negative   stress test - normal     2. HTN  -controlled  -c/w norvasc, lisinopril  -continue to monitor BP    3. DVT Prophylaxis  -hep subq
72 y/o AA woman w/  HTN, Stroke (not on AP or statin), neurosarcoidosis, seizure (not on AED), and dementia who presents to the ER w/ around 5 days of increasing dyspnea and chest pain with exertion.   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    b12 low 350 TSH WNL RPR neg  CTH stable unremarkble old L frontal injury   Impression: Dementia, need to r/o reversible causes.  r/o new intracranial lesion (low suspicion)   - given h/o prior stroke should be on an antiplatelet and statin.  states she is allergic to asa. if true allergy would consider plavix as alternative otherwise asa has lower bleeding risk.  getting asa here with no problem   - lipitor 40. LDL goal <70  - started on BB   - no objection to c/w memantine 5mg daily  - r/o reversible causes of dementia. check b12, RPR, TSH --> B12 low normal 350, would supplement   - CP per cardio and primary team  - PT/OT  - check FS, glucose control <180  - GI/DVT ppx  - Counseling on diet, exercise, and medication adherence was done  - Counseling on smoking cessation and alcohol consumption offered when appropriate.  - Pain assessed and judicious use of narcotics when appropriate was discussed.    - Stroke education given when appropriate.  - Importance of fall prevention discussed.   - Differential diagnosis and plan of care discussed with patient and/or family and primary team  - Thank you for allowing me to participate in the care of this patient. Call with questions.   - further outpt dementia workup on discharge ; d/c plan today   Adan Collazo MD  Vascular Neurology  Office: [phone]     
 70 y/o woman w/ hx of HTN, CVA, neurosarcoidosis, seizure, and dementia who presents to the ER w/ around 5 days of increasing dyspnea and chest pain with exertion.    Problem/Plan - 1:  ·  Problem: Chest pain.   ·  Plan: Pt with 5 days of exertional dyspnea and chest pain, currently asymptomatic and on room air  -differential includes pulm sarcoid/ILD vs CAD. Trop <6 and probnp unremarkable. EKG nonischemic  -given cxr findings of increased interstitial markings and pt's past hx of neurosarcoid, will obtain high res CT to further evaluate, may need rheum vs pulm input after imaging  -clinically does not appear in heart failure. Obtain echo and watch on tele for now  -f/u covid pcr. pt afebrile with no leukocytosis.    Problem/Plan - 2:  ·  Problem: CVA (cerebrovascular accident).   ·  Plan: prior cva in the past. Admitted one year ago and tolerated aspirin and statin (no longer on med list, pt self discontinued per daughter).   -pt's med allergy list at bedside includes aspirin as an allergy, however pt had been on aspirin last year during her stroke admission with no issue. Pt's daughter says no new allergic event has happened since discharge and that pt likely forgot that she can tolerate it due to her dementia (unclear what allergic reaction was to aspirin many years ago)  restart asa/statin.: : tolerating meds       Problem/Plan - 3:  ·  Problem: Neurosarcoidosis.   ·  Plan: with progressive dementia, Aox3 but very forgetful.   b12 low nl  : supplement .. tsh and folate acceptable levels   -resume memantine. Workup as above  -Does not have neurologist nor PCP, will need f/u   - CT reviewed with neuro : no further perez ..no indication for steroids   - CT chest : No acute pulmonary disease.  Multiple calcified mediastinal lymph nodes, consistent with prior   granulomatous disease.  Right upper lobe nodule measuring 4 mm. Recommend 1 year follow-up in a   high risk patient.  fu as op :  consider pul prior to dc       Problem/Plan - 4:  ·  Problem: Hypertension.   ·  Plan: resume home amlodipine and aceI.    Problem/Plan - 5:  ·  Problem: Need for prophylactic measure.   ·  Plan: lovenox subcu for dvt ppx, dash diet.

## 2021-12-24 NOTE — DISCHARGE NOTE PROVIDER - NSDCCPCAREPLAN_GEN_ALL_CORE_FT
PRINCIPAL DISCHARGE DIAGNOSIS  Diagnosis: Chest pain  Assessment and Plan of Treatment: You presented to the hospital with shortness of breath and chest pain  - Oxygen saturation roseanna on room air  - Cardiac enzymes normal  - CT chest: No acute pulmonary disease. Multiple calcified mediastinal lymph nodes, consistent with prior granulomatous disease. Right upper lobe nodule measuring 4 mm. Recommend 1 year follow-up in a  high risk patient.  - Stress test normal  - Echo: Ejection fraction: 66%  1. Mitral annular calcification, otherwise normal mitral  valve. Minimal mitral regurgitation.  2. Normal left ventricular internal dimensions and wall  thicknesses.  3. Normal left ventricular systolic function. No segmental  wall motion abnormalities.  4. Mild diastolic dysfunction (Stage I).  5. Normal right ventricular size and function.  - Outpatient follow up.  Please follow up with your primary care provider within 1 week of discharge from the hospital. If you do not have a primary care provider please follow up with the [facility], call [phone].      SECONDARY DISCHARGE DIAGNOSES  Diagnosis: Neurosarcoidosis  Assessment and Plan of Treatment: Please follow up with pulmonologist and rheumatologist as outpatient for further management.  Please follow up with your primary care provider within 1 week of discharge from the hospital. If you do not have a primary care provider please follow up with the [facility], call [phone].    Diagnosis: CVA (cerebrovascular accident)  Assessment and Plan of Treatment: Continue Asprin Statin therapy.  Continue your medications as directed and please follow-up as an outpatient with your primary care provider for further care and recommendations.    Diagnosis: Hypertension  Assessment and Plan of Treatment: Please take medications as indicated.  Continue blood pressure medication regimen as directed. Monitor for any visual changes, headaches or dizziness.  Monitor blood pressure regularly.  Follow up with your PCP for further management for high blood pressure.     PRINCIPAL DISCHARGE DIAGNOSIS  Diagnosis: Chest pain  Assessment and Plan of Treatment: You presented to the hospital with shortness of breath and chest pain  - Oxygen saturation roseanna on room air  - Cardiac enzymes normal  - CT chest: No acute pulmonary disease. Multiple calcified mediastinal lymph nodes, consistent with prior granulomatous disease. Right upper lobe nodule measuring 4 mm. Recommend 1 year follow-up in a  high risk patient.  - Stress test normal  - Echo: Ejection fraction: 66%  1. Mitral annular calcification, otherwise normal mitral  valve. Minimal mitral regurgitation.  2. Normal left ventricular internal dimensions and wall  thicknesses.  3. Normal left ventricular systolic function. No segmental  wall motion abnormalities.  4. Mild diastolic dysfunction (Stage I).  5. Normal right ventricular size and function.  - Outpatient follow up.  Please follow up with your primary care provider within 1 week of discharge from the hospital. If you do not have a primary care provider please follow up with the [facility], call [phone].      SECONDARY DISCHARGE DIAGNOSES  Diagnosis: Neurosarcoidosis  Assessment and Plan of Treatment: Please follow up with pulmonologist as oupatient. Number for [facility] was provided to you. Please call to make an appointment.  Please follow up with your primary care provider within 1 week of discharge from the hospital. If you do not have a primary care provider please follow up with the [facility], call [phone].    Diagnosis: CVA (cerebrovascular accident)  Assessment and Plan of Treatment: Continue Asprin Statin therapy.  Continue your medications as directed and please follow-up as an outpatient with your primary care provider for further care and recommendations.    Diagnosis: Hypertension  Assessment and Plan of Treatment: Please take medications as indicated.  Continue blood pressure medication regimen as directed. Monitor for any visual changes, headaches or dizziness.  Monitor blood pressure regularly.  Follow up with your PCP for further management for high blood pressure.     PRINCIPAL DISCHARGE DIAGNOSIS  Diagnosis: Chest pain  Assessment and Plan of Treatment: You presented to the hospital with shortness of breath and chest pain  - Oxygen saturation roseanna on room air  - Cardiac enzymes normal  - CT chest: No acute pulmonary disease. Multiple calcified mediastinal lymph nodes, consistent with prior granulomatous disease. Right upper lobe nodule measuring 4 mm. Recommend 1 year follow-up in a  high risk patient.  - Stress test normal  - Echo: Ejection fraction: 66%  1. Mitral annular calcification, otherwise normal mitral  valve. Minimal mitral regurgitation.  2. Normal left ventricular internal dimensions and wall  thicknesses.  3. Normal left ventricular systolic function. No segmental  wall motion abnormalities.  4. Mild diastolic dysfunction (Stage I).  5. Normal right ventricular size and function.  - Outpatient follow up.  Please follow up with your primary care provider within 1 week of discharge from the hospital. If you do not have a primary care provider please follow up with the [facility], call [phone].      SECONDARY DISCHARGE DIAGNOSES  Diagnosis: Neurosarcoidosis  Assessment and Plan of Treatment: Please follow up with pulmonologist as oupatient. Number for [facility] was provided to you. Please call to make an appointment.  Please follow up with your primary care provider within 1 week of discharge from the hospital. If you do not have a primary care provider please follow up with the [facility], call [phone].    Diagnosis: CVA (cerebrovascular accident)  Assessment and Plan of Treatment: Continue Asprin Statin therapy.  Continue your medications as directed and please follow-up as an outpatient with your primary care provider for further care and recommendations.    Diagnosis: Hypertension  Assessment and Plan of Treatment: You were started on Metoprolol.  Continue ALmodipine-benzapril combination pill  Please take medications as indicated.  Continue blood pressure medication regimen as directed. Monitor for any visual changes, headaches or dizziness.  Monitor blood pressure regularly.  Follow up with your PCP for further management for high blood pressure.

## 2021-12-24 NOTE — DISCHARGE NOTE PROVIDER - NSDCFUADDAPPT_GEN_ALL_CORE_FT
Please follow up with your primary care provider within 1 week of discharge from the hospital. If you do not have a primary care provider please follow up with the [facility], call [phone]  Please follow up with your primary care provider within 1 week of discharge from the hospital. If you do not have a primary care provider please follow up with the [facility], call [phone].

## 2023-05-03 NOTE — PHYSICAL THERAPY INITIAL EVALUATION ADULT - GAIT DEVIATIONS NOTED, PT EVAL
Where Is Your Acne Located?: Face
Your Weight In Pounds:: 150
decreased inga/increased time in double stance/decreased weight-shifting ability

## 2024-12-24 NOTE — EEG REPORT - RECORDING TECHNIQUE:
Statement Selected DVT ppx: SCDs, hold chemical AC i/s/o recurrent GI bleeds  Diet: soft/bite sized, DASH/TLC  Dispo: pending, PT eval re-ordered  GOC: Full Code DVT ppx: SCDs, hold chemical AC i/s/o recurrent GI bleeds  Diet: soft/bite sized, DASH/TLC  Dispo: planning for d/c home with home care today  GOC: Full Code

## 2024-12-30 NOTE — OCCUPATIONAL THERAPY INITIAL EVALUATION ADULT - BED MOBILITY/TRANSFERS, PREVIOUS LEVEL OF FUNCTION, OT EVAL
Patient presents to ED with left groin pain that started at 0200 along with a scattered rash to entire body of small red dots and states she feels weak today.     needs device/uses a cane at times/independent